# Patient Record
Sex: FEMALE | Race: WHITE | Employment: OTHER | URBAN - METROPOLITAN AREA
[De-identification: names, ages, dates, MRNs, and addresses within clinical notes are randomized per-mention and may not be internally consistent; named-entity substitution may affect disease eponyms.]

---

## 2017-05-19 ENCOUNTER — HOSPITAL ENCOUNTER (OUTPATIENT)
Dept: MAMMOGRAPHY | Age: 72
Discharge: HOME | End: 2017-05-19
Attending: OBSTETRICS & GYNECOLOGY
Payer: MEDICARE

## 2017-05-19 DIAGNOSIS — Z12.31 VISIT FOR SCREENING MAMMOGRAM: ICD-10-CM

## 2017-05-19 PROCEDURE — 77067 SCR MAMMO BI INCL CAD: CPT

## 2018-01-18 ENCOUNTER — ANESTHESIA (OUTPATIENT)
Dept: NON INVASIVE DIAGNOSTICS | Age: 73
End: 2018-01-18
Payer: MEDICARE

## 2018-01-18 ENCOUNTER — HOSPITAL ENCOUNTER (OUTPATIENT)
Dept: NON INVASIVE DIAGNOSTICS | Age: 73
Discharge: HOME | End: 2018-01-19
Attending: INTERNAL MEDICINE | Admitting: INTERNAL MEDICINE
Payer: MEDICARE

## 2018-01-18 ENCOUNTER — ANESTHESIA EVENT (OUTPATIENT)
Dept: NON INVASIVE DIAGNOSTICS | Age: 73
End: 2018-01-18
Payer: MEDICARE

## 2018-01-18 PROBLEM — Z98.890 S/P ABLATION OF ATRIAL FIBRILLATION: Status: ACTIVE | Noted: 2018-01-18

## 2018-01-18 PROBLEM — Z86.79 S/P ABLATION OF ATRIAL FIBRILLATION: Status: ACTIVE | Noted: 2018-01-18

## 2018-01-18 PROCEDURE — 76060000039 HC ANESTHESIA 4 TO 4.5 HR

## 2018-01-18 PROCEDURE — 74011250636 HC RX REV CODE- 250/636

## 2018-01-18 PROCEDURE — 77030034850

## 2018-01-18 PROCEDURE — 93662 INTRACARDIAC ECG (ICE): CPT

## 2018-01-18 PROCEDURE — 77030003700 HC NDL TSEPTL STJU -C

## 2018-01-18 PROCEDURE — 77030011640 HC PAD GRND REM COVD -A

## 2018-01-18 PROCEDURE — 77030018729 HC ELECTRD DEFIB PAD CARD -B

## 2018-01-18 PROCEDURE — 93613 INTRACARDIAC EPHYS 3D MAPG: CPT

## 2018-01-18 PROCEDURE — 74011250636 HC RX REV CODE- 250/636: Performed by: INTERNAL MEDICINE

## 2018-01-18 PROCEDURE — 77030013797 HC KT TRNSDUC PRSSR EDWD -A

## 2018-01-18 PROCEDURE — 76210000006 HC OR PH I REC 0.5 TO 1 HR

## 2018-01-18 PROCEDURE — C1759 CATH, INTRA ECHOCARDIOGRAPHY: HCPCS

## 2018-01-18 PROCEDURE — 77030026438 HC STYL ET INTUB CARD -A: Performed by: ANESTHESIOLOGY

## 2018-01-18 PROCEDURE — C1766 INTRO/SHEATH,STRBLE,NON-PEEL: HCPCS

## 2018-01-18 PROCEDURE — C1731 CATH, EP, 20 OR MORE ELEC: HCPCS

## 2018-01-18 PROCEDURE — 93325 DOPPLER ECHO COLOR FLOW MAPG: CPT

## 2018-01-18 PROCEDURE — 77030016898 HC CBL EP EXT3 STJU -B

## 2018-01-18 PROCEDURE — 77030030806 HC PTCH ENSIT NAVX STJU -G

## 2018-01-18 PROCEDURE — 77030033352 HC TBNG IRR PMP COOL PNT STJU -B

## 2018-01-18 PROCEDURE — 74011000250 HC RX REV CODE- 250

## 2018-01-18 PROCEDURE — 74011250637 HC RX REV CODE- 250/637: Performed by: INTERNAL MEDICINE

## 2018-01-18 PROCEDURE — 77030008684 HC TU ET CUF COVD -B: Performed by: ANESTHESIOLOGY

## 2018-01-18 PROCEDURE — 77010033678 HC OXYGEN DAILY

## 2018-01-18 PROCEDURE — 77030029359 HC PRB ESOPH TEMP CATH ANTM -F

## 2018-01-18 PROCEDURE — 77030015398 HC CBL EP EXT STJU -C

## 2018-01-18 PROCEDURE — 77030029065 HC DRSG HEMO QCLOT ZMED -B

## 2018-01-18 PROCEDURE — C1894 INTRO/SHEATH, NON-LASER: HCPCS

## 2018-01-18 PROCEDURE — C1893 INTRO/SHEATH, FIXED,NON-PEEL: HCPCS

## 2018-01-18 PROCEDURE — C2630 CATH, EP, COOL-TIP: HCPCS

## 2018-01-18 RX ORDER — MIDAZOLAM HYDROCHLORIDE 1 MG/ML
INJECTION, SOLUTION INTRAMUSCULAR; INTRAVENOUS
Status: DISCONTINUED
Start: 2018-01-18 | End: 2018-01-18

## 2018-01-18 RX ORDER — AMIODARONE HYDROCHLORIDE 200 MG/1
200 TABLET ORAL 2 TIMES DAILY
COMMUNITY
End: 2019-09-23

## 2018-01-18 RX ORDER — LIDOCAINE HYDROCHLORIDE 20 MG/ML
INJECTION, SOLUTION EPIDURAL; INFILTRATION; INTRACAUDAL; PERINEURAL AS NEEDED
Status: DISCONTINUED | OUTPATIENT
Start: 2018-01-18 | End: 2018-01-18

## 2018-01-18 RX ORDER — DEXAMETHASONE SODIUM PHOSPHATE 4 MG/ML
INJECTION, SOLUTION INTRA-ARTICULAR; INTRALESIONAL; INTRAMUSCULAR; INTRAVENOUS; SOFT TISSUE AS NEEDED
Status: DISCONTINUED | OUTPATIENT
Start: 2018-01-18 | End: 2018-01-18 | Stop reason: HOSPADM

## 2018-01-18 RX ORDER — OXYCODONE AND ACETAMINOPHEN 5; 325 MG/1; MG/1
1 TABLET ORAL
Status: DISCONTINUED | OUTPATIENT
Start: 2018-01-18 | End: 2018-01-19 | Stop reason: HOSPADM

## 2018-01-18 RX ORDER — PROTAMINE SULFATE 10 MG/ML
25-100 INJECTION, SOLUTION INTRAVENOUS
Status: DISCONTINUED | OUTPATIENT
Start: 2018-01-18 | End: 2018-01-18

## 2018-01-18 RX ORDER — ACETAMINOPHEN 325 MG/1
650 TABLET ORAL
Status: DISCONTINUED | OUTPATIENT
Start: 2018-01-18 | End: 2018-01-19 | Stop reason: HOSPADM

## 2018-01-18 RX ORDER — ONDANSETRON 2 MG/ML
INJECTION INTRAMUSCULAR; INTRAVENOUS AS NEEDED
Status: DISCONTINUED | OUTPATIENT
Start: 2018-01-18 | End: 2018-01-18 | Stop reason: HOSPADM

## 2018-01-18 RX ORDER — HEPARIN SODIUM 1000 [USP'U]/ML
INJECTION, SOLUTION INTRAVENOUS; SUBCUTANEOUS
Status: DISCONTINUED | OUTPATIENT
Start: 2018-01-18 | End: 2018-01-18 | Stop reason: HOSPADM

## 2018-01-18 RX ORDER — SUCRALFATE 1 G/10ML
1 SUSPENSION ORAL
Status: DISCONTINUED | OUTPATIENT
Start: 2018-01-18 | End: 2018-01-19 | Stop reason: HOSPADM

## 2018-01-18 RX ORDER — FENTANYL CITRATE 50 UG/ML
12.5-5 INJECTION, SOLUTION INTRAMUSCULAR; INTRAVENOUS
Status: DISCONTINUED | OUTPATIENT
Start: 2018-01-18 | End: 2018-01-18

## 2018-01-18 RX ORDER — SODIUM CHLORIDE 0.9 % (FLUSH) 0.9 %
5-10 SYRINGE (ML) INJECTION AS NEEDED
Status: DISCONTINUED | OUTPATIENT
Start: 2018-01-18 | End: 2018-01-19 | Stop reason: HOSPADM

## 2018-01-18 RX ORDER — FENTANYL CITRATE 50 UG/ML
INJECTION, SOLUTION INTRAMUSCULAR; INTRAVENOUS AS NEEDED
Status: DISCONTINUED | OUTPATIENT
Start: 2018-01-18 | End: 2018-01-18 | Stop reason: HOSPADM

## 2018-01-18 RX ORDER — EPHEDRINE SULFATE 50 MG/ML
INJECTION, SOLUTION INTRAVENOUS AS NEEDED
Status: DISCONTINUED | OUTPATIENT
Start: 2018-01-18 | End: 2018-01-18 | Stop reason: HOSPADM

## 2018-01-18 RX ORDER — PROPOFOL 10 MG/ML
INJECTION, EMULSION INTRAVENOUS AS NEEDED
Status: DISCONTINUED | OUTPATIENT
Start: 2018-01-18 | End: 2018-01-18 | Stop reason: HOSPADM

## 2018-01-18 RX ORDER — HEPARIN SODIUM 1000 [USP'U]/ML
30000 INJECTION, SOLUTION INTRAVENOUS; SUBCUTANEOUS ONCE
Status: COMPLETED | OUTPATIENT
Start: 2018-01-18 | End: 2018-01-18

## 2018-01-18 RX ORDER — ENOXAPARIN SODIUM 100 MG/ML
1 INJECTION SUBCUTANEOUS EVERY 12 HOURS
Status: COMPLETED | OUTPATIENT
Start: 2018-01-18 | End: 2018-01-19

## 2018-01-18 RX ORDER — HYDROCHLOROTHIAZIDE 25 MG/1
25 TABLET ORAL DAILY
COMMUNITY
End: 2022-02-07

## 2018-01-18 RX ORDER — AMIODARONE HYDROCHLORIDE 200 MG/1
200 TABLET ORAL 2 TIMES DAILY
Status: DISCONTINUED | OUTPATIENT
Start: 2018-01-18 | End: 2018-01-19 | Stop reason: HOSPADM

## 2018-01-18 RX ORDER — SODIUM CHLORIDE 9 MG/ML
INJECTION, SOLUTION INTRAVENOUS
Status: DISCONTINUED | OUTPATIENT
Start: 2018-01-18 | End: 2018-01-18 | Stop reason: HOSPADM

## 2018-01-18 RX ORDER — MIDAZOLAM HYDROCHLORIDE 1 MG/ML
1-5 INJECTION, SOLUTION INTRAMUSCULAR; INTRAVENOUS
Status: DISCONTINUED | OUTPATIENT
Start: 2018-01-18 | End: 2018-01-18

## 2018-01-18 RX ORDER — HEPARIN SODIUM 10000 [USP'U]/100ML
INJECTION, SOLUTION INTRAVENOUS
Status: DISCONTINUED
Start: 2018-01-18 | End: 2018-01-18

## 2018-01-18 RX ORDER — SODIUM CHLORIDE 0.9 % (FLUSH) 0.9 %
5-10 SYRINGE (ML) INJECTION EVERY 8 HOURS
Status: DISCONTINUED | OUTPATIENT
Start: 2018-01-18 | End: 2018-01-19 | Stop reason: HOSPADM

## 2018-01-18 RX ORDER — LORATADINE 10 MG/1
10 TABLET ORAL DAILY
Status: DISCONTINUED | OUTPATIENT
Start: 2018-01-19 | End: 2018-01-19 | Stop reason: HOSPADM

## 2018-01-18 RX ORDER — FENTANYL CITRATE 50 UG/ML
INJECTION, SOLUTION INTRAMUSCULAR; INTRAVENOUS
Status: DISCONTINUED
Start: 2018-01-18 | End: 2018-01-18

## 2018-01-18 RX ORDER — SUCCINYLCHOLINE CHLORIDE 20 MG/ML
INJECTION INTRAMUSCULAR; INTRAVENOUS AS NEEDED
Status: DISCONTINUED | OUTPATIENT
Start: 2018-01-18 | End: 2018-01-18 | Stop reason: HOSPADM

## 2018-01-18 RX ORDER — PANTOPRAZOLE SODIUM 40 MG/1
40 TABLET, DELAYED RELEASE ORAL
Status: DISCONTINUED | OUTPATIENT
Start: 2018-01-19 | End: 2018-01-19 | Stop reason: HOSPADM

## 2018-01-18 RX ORDER — PHENYLEPHRINE HCL IN 0.9% NACL 0.4MG/10ML
SYRINGE (ML) INTRAVENOUS AS NEEDED
Status: DISCONTINUED | OUTPATIENT
Start: 2018-01-18 | End: 2018-01-18 | Stop reason: HOSPADM

## 2018-01-18 RX ORDER — HYDROCHLOROTHIAZIDE 25 MG/1
25 TABLET ORAL DAILY
Status: DISCONTINUED | OUTPATIENT
Start: 2018-01-19 | End: 2018-01-19 | Stop reason: HOSPADM

## 2018-01-18 RX ORDER — METOPROLOL SUCCINATE 50 MG/1
50 TABLET, EXTENDED RELEASE ORAL DAILY
Status: DISCONTINUED | OUTPATIENT
Start: 2018-01-19 | End: 2018-01-19 | Stop reason: HOSPADM

## 2018-01-18 RX ORDER — DOBUTAMINE HYDROCHLORIDE 200 MG/100ML
INJECTION INTRAVENOUS
Status: DISCONTINUED
Start: 2018-01-18 | End: 2018-01-18

## 2018-01-18 RX ORDER — MIDAZOLAM HYDROCHLORIDE 1 MG/ML
INJECTION, SOLUTION INTRAMUSCULAR; INTRAVENOUS AS NEEDED
Status: DISCONTINUED | OUTPATIENT
Start: 2018-01-18 | End: 2018-01-18 | Stop reason: HOSPADM

## 2018-01-18 RX ORDER — HEPARIN SODIUM 200 [USP'U]/100ML
2000 INJECTION, SOLUTION INTRAVENOUS ONCE
Status: COMPLETED | OUTPATIENT
Start: 2018-01-18 | End: 2018-01-18

## 2018-01-18 RX ORDER — ONDANSETRON 2 MG/ML
4 INJECTION INTRAMUSCULAR; INTRAVENOUS
Status: DISCONTINUED | OUTPATIENT
Start: 2018-01-18 | End: 2018-01-19 | Stop reason: HOSPADM

## 2018-01-18 RX ADMIN — Medication 40 MCG: at 10:54

## 2018-01-18 RX ADMIN — SUCCINYLCHOLINE CHLORIDE 160 MG: 20 INJECTION INTRAMUSCULAR; INTRAVENOUS at 09:32

## 2018-01-18 RX ADMIN — SUCCINYLCHOLINE CHLORIDE 40 MG: 20 INJECTION INTRAMUSCULAR; INTRAVENOUS at 09:33

## 2018-01-18 RX ADMIN — ACETAMINOPHEN 650 MG: 325 TABLET ORAL at 20:21

## 2018-01-18 RX ADMIN — EPHEDRINE SULFATE 5 MG: 50 INJECTION, SOLUTION INTRAVENOUS at 12:35

## 2018-01-18 RX ADMIN — EPHEDRINE SULFATE 7.5 MG: 50 INJECTION, SOLUTION INTRAVENOUS at 09:56

## 2018-01-18 RX ADMIN — HEPARIN SODIUM 3000 UNITS: 1000 INJECTION, SOLUTION INTRAVENOUS; SUBCUTANEOUS at 11:10

## 2018-01-18 RX ADMIN — PROTAMINE SULFATE 70 MG: 10 INJECTION, SOLUTION INTRAVENOUS at 13:06

## 2018-01-18 RX ADMIN — HEPARIN SODIUM 3000 UNITS: 1000 INJECTION, SOLUTION INTRAVENOUS; SUBCUTANEOUS at 13:01

## 2018-01-18 RX ADMIN — SODIUM CHLORIDE: 9 INJECTION, SOLUTION INTRAVENOUS at 13:30

## 2018-01-18 RX ADMIN — FENTANYL CITRATE 12.5 MCG: 50 INJECTION, SOLUTION INTRAMUSCULAR; INTRAVENOUS at 15:01

## 2018-01-18 RX ADMIN — Medication 10 ML: at 15:02

## 2018-01-18 RX ADMIN — PROPOFOL 50 MG: 10 INJECTION, EMULSION INTRAVENOUS at 10:56

## 2018-01-18 RX ADMIN — MIDAZOLAM HYDROCHLORIDE 2 MG: 1 INJECTION, SOLUTION INTRAMUSCULAR; INTRAVENOUS at 09:27

## 2018-01-18 RX ADMIN — FENTANYL CITRATE 50 MCG: 50 INJECTION, SOLUTION INTRAMUSCULAR; INTRAVENOUS at 09:32

## 2018-01-18 RX ADMIN — HEPARIN SODIUM 13000 UNITS: 1000 INJECTION, SOLUTION INTRAVENOUS; SUBCUTANEOUS at 10:21

## 2018-01-18 RX ADMIN — EPHEDRINE SULFATE 5 MG: 50 INJECTION, SOLUTION INTRAVENOUS at 11:00

## 2018-01-18 RX ADMIN — ENOXAPARIN SODIUM 120 MG: 60 INJECTION SUBCUTANEOUS at 20:21

## 2018-01-18 RX ADMIN — AMIODARONE HYDROCHLORIDE 200 MG: 200 TABLET ORAL at 19:19

## 2018-01-18 RX ADMIN — PROPOFOL 200 MG: 10 INJECTION, EMULSION INTRAVENOUS at 09:32

## 2018-01-18 RX ADMIN — SODIUM CHLORIDE: 9 INJECTION, SOLUTION INTRAVENOUS at 09:11

## 2018-01-18 RX ADMIN — HEPARIN SODIUM 1000 UNITS/HR: 1000 INJECTION, SOLUTION INTRAVENOUS; SUBCUTANEOUS at 10:22

## 2018-01-18 RX ADMIN — SUCRALFATE 1 G: 1 SUSPENSION ORAL at 15:54

## 2018-01-18 RX ADMIN — ACETAMINOPHEN 650 MG: 325 TABLET ORAL at 15:54

## 2018-01-18 RX ADMIN — FENTANYL CITRATE 50 MCG: 50 INJECTION, SOLUTION INTRAMUSCULAR; INTRAVENOUS at 09:45

## 2018-01-18 RX ADMIN — PROPOFOL 50 MG: 10 INJECTION, EMULSION INTRAVENOUS at 10:58

## 2018-01-18 RX ADMIN — HEPARIN SODIUM 5000 UNITS: 1000 INJECTION, SOLUTION INTRAVENOUS; SUBCUTANEOUS at 10:37

## 2018-01-18 RX ADMIN — PROPOFOL 30 MG: 10 INJECTION, EMULSION INTRAVENOUS at 10:00

## 2018-01-18 RX ADMIN — ONDANSETRON 4 MG: 2 INJECTION INTRAMUSCULAR; INTRAVENOUS at 13:15

## 2018-01-18 RX ADMIN — HEPARIN SODIUM 3000 UNITS: 1000 INJECTION, SOLUTION INTRAVENOUS; SUBCUTANEOUS at 11:34

## 2018-01-18 RX ADMIN — Medication 40 MCG: at 10:00

## 2018-01-18 RX ADMIN — HEPARIN SODIUM 3000 UNITS: 1000 INJECTION, SOLUTION INTRAVENOUS; SUBCUTANEOUS at 10:53

## 2018-01-18 RX ADMIN — DEXAMETHASONE SODIUM PHOSPHATE 10 MG: 4 INJECTION, SOLUTION INTRA-ARTICULAR; INTRALESIONAL; INTRAMUSCULAR; INTRAVENOUS; SOFT TISSUE at 09:59

## 2018-01-18 RX ADMIN — Medication 80 MCG: at 11:11

## 2018-01-18 RX ADMIN — Medication 4000 UNITS: at 10:01

## 2018-01-18 RX ADMIN — EPHEDRINE SULFATE 5 MG: 50 INJECTION, SOLUTION INTRAVENOUS at 10:45

## 2018-01-18 NOTE — ANESTHESIA POSTPROCEDURE EVALUATION
Post-Anesthesia Evaluation and Assessment    Patient: Kim Singh MRN: [id number]  SSN: [id number]    YOB: 1945  Age: 81 y.o. Sex: female       Cardiovascular Function/Vital Signs  Visit Vitals    /64    Pulse 63    Temp 36.7 °C (98 °F)    Resp 13    Ht 5' 2\" (1.575 m)    Wt 124.7 kg (275 lb)    SpO2 95%    BMI 50.3 kg/m2       Patient is status post general anesthesia for * No procedures listed *. Nausea/Vomiting: None    Postoperative hydration reviewed and adequate. Pain:  Pain Scale 1: Numeric (0 - 10) (01/18/18 1415)  Pain Intensity 1: 0 (01/18/18 1415)   Managed    Neurological Status:   Neuro (WDL): Within Defined Limits (01/18/18 1415)   At baseline    Mental Status and Level of Consciousness: Arousable    Pulmonary Status:   O2 Device: Nasal cannula (01/18/18 1415)   Adequate oxygenation and airway patent    Complications related to anesthesia: None    Post-anesthesia assessment completed.  No concerns    Signed By: Kendell Mcwilliams MD     January 18, 2018

## 2018-01-18 NOTE — PROGRESS NOTES
YUNI preliminarily shows normal LV systolic fx, moderate MR without prolapse or vegetation, dilated LA without thrombus, no HARMONY thrombus, mildly-elevated R sided pressure per TR jet, small circumferential pericardial effusion, no significant aortic dilation, dissection, or plaque. Full report to follow.

## 2018-01-18 NOTE — PERIOP NOTES
Handoff Report from Operating Room to PACU    Report received from [address] Moore ( Tonya Andino CRNA/Celena Moses CRNA regarding [employer]. Surgeon(s):  Case Anesthesia  And Procedure(s) (LRB):  PACU/RECOVERY                                    EP/LAB (N/A)  confirmed   with allergies, drains and dressings discussed. Anesthesia type, drugs, patient history, complications, estimated blood loss, vital signs, intake and output, and last pain medication were reviewed.

## 2018-01-18 NOTE — ANESTHESIA PREPROCEDURE EVALUATION
Anesthetic History   No history of anesthetic complications            Review of Systems / Medical History  Patient summary reviewed, nursing notes reviewed and pertinent labs reviewed    Pulmonary  Within defined limits                 Neuro/Psych         Headaches     Cardiovascular            Dysrhythmias : atrial fibrillation      Exercise tolerance: <4 METS  Comments: Hx Sick sinus syndrome      GI/Hepatic/Renal  Within defined limits              Endo/Other        Morbid obesity and arthritis     Other Findings   Comments: Hx Colovaginal fistula           Physical Exam    Airway  Mallampati: III  TM Distance: > 6 cm  Neck ROM: normal range of motion   Mouth opening: Normal     Cardiovascular  Regular rate and rhythm,  S1 and S2 normal,  no murmur, click, rub, or gallop             Dental  No notable dental hx       Pulmonary  Breath sounds clear to auscultation               Abdominal  GI exam deferred       Other Findings            Anesthetic Plan    ASA: 3  Anesthesia type: general          Induction: Intravenous  Anesthetic plan and risks discussed with: Patient

## 2018-01-18 NOTE — PROGRESS NOTES
S/p catheter PVI with RFA of 4/4 PV's. During PVI, the atypical and likely LA flutter terminated. LA roof, antral mitral isthmus, CTI lines done. The ant mitral isthmus line had conduction delay at the superior portion near the LA roof line--after multiple applications of RFA in that region. Pacing induced RA flutter which terminated during the CTI line. Full report to follow. No complications.

## 2018-01-18 NOTE — IP AVS SNAPSHOT
[address] [facility] 
[phone] Patient: Lella Castleman MRN: [id number] VRA:6/14/7264 About your hospitalization You were admitted on:  January 18, 2018 You last received care in the:  [facility] CARDIO You were discharged on:  January 19, 2018 Why you were hospitalized Your primary diagnosis was:  Not on File Your diagnoses also included:  S/P Ablation Of Atrial Fibrillation Follow-up Information Follow up With Details Comments Contact Info Shelle Gaucher, MD Schedule an appointment as soon as possible for a visit in 1 week for post hospitalization follow up appointment. [address] [facility] [address] [facility] 
[phone] Caleb Begum NP Schedule an appointment as soon as possible for a visit in 2 weeks for post-ablation follow-up [address] [facility] [address] [facility] 
[phone] Discharge Orders None A check harvey indicates which time of day the medication should be taken. My Medications START taking these medications Instructions Each Dose to Equal  
 Morning Noon Evening Bedtime  
 pantoprazole 40 mg tablet Commonly known as:  PROTONIX Start taking on:  1/20/2018 Your last dose was: Your next dose is: Take 1 Tab by mouth Daily (before breakfast). 40 mg  
    
   
   
   
  
 sucralfate 100 mg/mL suspension Commonly known as:  Martinez Kamara Your last dose was: Your next dose is: Take 10 mL by mouth Before breakfast and dinner for 30 days. 1 g CONTINUE taking these medications Instructions Each Dose to Equal  
 Morning Noon Evening Bedtime  
 amiodarone 200 mg tablet Commonly known as:  CORDARONE Your last dose was: Your next dose is: Take 200 mg by mouth two (2) times a day.   
 200 mg  
    
   
   
   
 hydroCHLOROthiazide 25 mg tablet Commonly known as:  HYDRODIURIL Your last dose was: Your next dose is: Take 25 mg by mouth daily. 25 mg  
    
   
   
   
  
 loratadine 10 mg tablet Commonly known as:  Martha Jayjay Your last dose was: Your next dose is: Take 10 mg by mouth. 10 mg  
    
   
   
   
  
 multivitamin tablet Commonly known as:  ONE A DAY Your last dose was: Your next dose is: Take 1 Tab by mouth daily. 1 Tab  
    
   
   
   
  
 ondansetron 8 mg disintegrating tablet Commonly known as:  ZOFRAN ODT Your last dose was: Your next dose is: Take 1 Tab by mouth every eight (8) hours as needed for Nausea. 8 mg XARELTO 20 mg Tab tablet Generic drug:  rivaroxaban Your last dose was: Your next dose is: Take 20 mg by mouth daily. 20 mg  
    
   
   
   
  
  
STOP taking these medications   
 metoprolol succinate 100 mg tablet Commonly known as:  TOPROL-XL Where to Get Your Medications These medications were sent to [facility], [address] [address]  [address] [place] [address] Phone:  [phone]  
  pantoprazole 40 mg tablet  
 sucralfate 100 mg/mL suspension Discharge Instructions POST-EP STUDY AND ABLATION DISCHARGE INSTRUCTIONS: 
 
You had a transesophageal echo followed by an atrial fibrillation ablation (using radiofrequency energy) with Dr. Carmelo Juarez on 1/18/2018. Please see the medication changes made. You will have two new medications that you'll take for one month only. Your metoprolol was discontinued due to lower heart rates and normal blood pressure. If you can check your blood pressure daily and call the office if the top number (systolic blood pressure) is greater than 150 mm Hg, that would be good. Follow-up with Dr. Carson Cole nurse practitioner, Brooke Crum, in 2 weeks. At that time, if you remain in your normal rhythm, will recommend lowering your amiodarone to once daily. A follow-up appointment will be made for 3 months with Dr. Katy Lundborg to evaluate for further medication adjustment. Do not drive, operate any machinery, or sign any legal documents for 24 hours after your procedure. You must have someone to drive you home. You may take a shower 24 hours after your cardiac procedure. Be sure to get the dressing wet and then remove it; gently wash the area with warm soapy water. Pat dry and leave open to air. To help prevent infections, be sure to keep the cath site clean and dry. No lotions, creams, powders, ointments, etc. in the cath site for approximately 1 week. ? Do not take a tub bath, get in a hot tub or swimming pool for approximately 5 days or until the cath site is completely healed. ? No strenuous activity or heavy lifting over 20 lbs. for 7 days. ? After your procedure, some bruising or discomfort is common during the healing process. Tylenol, 1-2 tablets every 6 hours as needed, is recommended if you experience any discomfort. If you experience any signs or symptoms of infection such as fever, chills, or poorly healing incision, persistent tenderness or swelling in the groin, redness and/or warmth to the touch, numbness, significant tingling or pain at the groin site or affected extremity, rash, drainage from the site, or if the leg feels tight or swollen, call your physician right away. ? If bleeding at the site occurs, take a clean gauze pad and apply direct pressure to the groin just above the puncture site, and call your physician right away. ? If your procedure involved ablation therapy, you may feel some mild or vague chest discomfort due to delivery of heat therapy to the heart muscle. This should resolve in 1-2 days.   If it gets worse or is associated with shortness of breath, dizziness, loss of consciousness, call your physician right away or call 911 if emergency medical care is needed. Signed: 
Mehnaz Nicole MD 
 
  
  
  
Introducing \[facility]\"" & HEALTH SERVICES! Dear Juliana: Thank you for requesting a [employer] account. Our records indicate that you have previously registered for a [employer] account but its currently inactive. Please call our [employer] support line at [phone]. Additional Information If you have questions, please visit the Frequently Asked Questions section of the [employer] website at [email or web address]/. Remember, [employer] is NOT to be used for urgent needs. For medical emergencies, dial 911. Now available from your iPhone and Android! Providers Seen During Your Hospitalization Provider Specialty Primary office phone Claire Turner MD Cardiology [phone] Your Primary Care Physician (PCP) Primary Care Physician Office Phone Office Fax Neetu Martinezmatti [phone] You are allergic to the following Allergen Reactions Pcn (Penicillins) Rash  
    
 Sulfa (Sulfonamide Antibiotics) Rash Recent Documentation Height Weight BMI OB Status Smoking Status 1.575 m 124.7 kg 50.3 kg/m2 Hysterectomy Never Smoker Emergency Contacts Name Discharge Info Relation Home Work Mobile [address] [email or web address] CAREGIVER [3] Spouse [3] [phone] Patient Belongings The following personal items are in your possession at time of discharge: 
  Dental Appliances: None         Home Medications: None   Jewelry: None  Clothing: Footwear, Pants, Shirt, Undergarments    Other Valuables: None Please provide this summary of care documentation to your next provider. Signatures-by signing, you are acknowledging that this After Visit Summary has been reviewed with you and you have received a copy. Patient Signature:  ____________________________________________________________ Date:  ____________________________________________________________  
  
Anna Sleeper Provider Signature:  ____________________________________________________________ Date:  ____________________________________________________________

## 2018-01-18 NOTE — PROGRESS NOTES
01/18/18 1844   Vital Signs   Temp 98.2 °F (36.8 °C)   Temp Source Oral   Pulse (Heart Rate) 68   Heart Rate Source Monitor   Resp Rate 20   O2 Sat (%) 98 %   Level of Consciousness Alert   /70   MAP (Monitor) 77   BP 1 Method Automatic   BP 1 Location Left arm   BP Patient Position Post activity   MEWS Score 1       Pt ambulated in hallway, complaints of mild fatigue, no SOB/pain, VSS, bilateral groin sites remain benign

## 2018-01-18 NOTE — PROGRESS NOTES
1455-pt arrived to room, VSS, bilateral groin sites no bleeding/heamatoma.   1500-pt complains of lower back pain, repositioned pt and gave PRN fentanyl   1548-pt complaining on pain in right shoulder, tylenol given, VSS, bilateral groin sites remain benign  1645-pt resting in bed, no complaints, back and shoulder feeling better, bilateral groin sites benign

## 2018-01-19 VITALS
HEIGHT: 62 IN | SYSTOLIC BLOOD PRESSURE: 116 MMHG | DIASTOLIC BLOOD PRESSURE: 62 MMHG | RESPIRATION RATE: 16 BRPM | BODY MASS INDEX: 50.61 KG/M2 | TEMPERATURE: 98.1 F | HEART RATE: 59 BPM | OXYGEN SATURATION: 95 % | WEIGHT: 275 LBS

## 2018-01-19 PROCEDURE — 74011250636 HC RX REV CODE- 250/636: Performed by: INTERNAL MEDICINE

## 2018-01-19 PROCEDURE — 74011250637 HC RX REV CODE- 250/637: Performed by: INTERNAL MEDICINE

## 2018-01-19 RX ORDER — PANTOPRAZOLE SODIUM 40 MG/1
40 TABLET, DELAYED RELEASE ORAL
Qty: 30 TAB | Refills: 0 | Status: SHIPPED | OUTPATIENT
Start: 2018-01-20 | End: 2019-09-23

## 2018-01-19 RX ORDER — SUCRALFATE 1 G/10ML
1 SUSPENSION ORAL
Qty: 600 ML | Refills: 0 | Status: SHIPPED | OUTPATIENT
Start: 2018-01-19 | End: 2018-02-18

## 2018-01-19 RX ADMIN — ENOXAPARIN SODIUM 120 MG: 60 INJECTION SUBCUTANEOUS at 08:31

## 2018-01-19 RX ADMIN — PANTOPRAZOLE SODIUM 40 MG: 40 TABLET, DELAYED RELEASE ORAL at 08:31

## 2018-01-19 RX ADMIN — Medication 10 ML: at 06:28

## 2018-01-19 RX ADMIN — ACETAMINOPHEN 650 MG: 325 TABLET ORAL at 06:28

## 2018-01-19 RX ADMIN — SUCRALFATE 1 G: 1 SUSPENSION ORAL at 08:31

## 2018-01-19 RX ADMIN — HYDROCHLOROTHIAZIDE 25 MG: 25 TABLET ORAL at 08:32

## 2018-01-19 RX ADMIN — AMIODARONE HYDROCHLORIDE 200 MG: 200 TABLET ORAL at 08:32

## 2018-01-19 NOTE — PROGRESS NOTES
S/p YUNI followed by Afib ablation (catheter RFA involving PVI, LA substrate work, CTI line). Ambulatory and taking oral.  Currently eating breakfast.  She feels good, no complaints. Visit Vitals    /62    Pulse (!) 59    Temp 98.1 °F (36.7 °C)    Resp 16    Ht 5' 2\" (1.575 m)    Wt 124.7 kg (275 lb)    SpO2 95%    BMI 50.3 kg/m2       ND, NAD.  RRR, no rub. Lungs CTAB anteriorly. Bilateral groin sites OK. Awake, appropriate, neuro grossly nonfocal.    Tele:  SR 60's with 1AVB. PLAN:  Discharge to home with F/U in the office in 2 weeks with Aleks Srinivasan NP. Will stop metoprolol. See discharge medications. All questions answered. Patient is aware of signs and sx warranting urgent med F/U or calling 911.     Signed:  Naveen Smith MD

## 2018-01-19 NOTE — DISCHARGE INSTRUCTIONS
POST-EP STUDY AND ABLATION DISCHARGE INSTRUCTIONS:    You had a transesophageal echo followed by an atrial fibrillation ablation (using radiofrequency energy) with Dr. Jesús Rodriguez on 1/18/2018. Please see the medication changes made. You will have two new medications that you'll take for one month only. Your metoprolol was discontinued due to lower heart rates and normal blood pressure. If you can check your blood pressure daily and call the office if the top number (systolic blood pressure) is greater than 150 mm Hg, that would be good. Follow-up with Dr. Estevan Momin nurse practitioner, Krystal Greco, in 2 weeks. At that time, if you remain in your normal rhythm, will recommend lowering your amiodarone to once daily. A follow-up appointment will be made for 3 months with Dr. Jesús Rodriguez to evaluate for further medication adjustment. Do not drive, operate any machinery, or sign any legal documents for 24 hours after your procedure. You must have someone to drive you home. You may take a shower 24 hours after your cardiac procedure. Be sure to get the dressing wet and then remove it; gently wash the area with warm soapy water. Pat dry and leave open to air. To help prevent infections, be sure to keep the cath site clean and dry. No lotions, creams, powders, ointments, etc. in the cath site for approximately 1 week.  Do not take a tub bath, get in a hot tub or swimming pool for approximately 5 days or until the cath site is completely healed.  No strenuous activity or heavy lifting over 20 lbs. for 7 days.  After your procedure, some bruising or discomfort is common during the healing process. Tylenol, 1-2 tablets every 6 hours as needed, is recommended if you experience any discomfort.   If you experience any signs or symptoms of infection such as fever, chills, or poorly healing incision, persistent tenderness or swelling in the groin, redness and/or warmth to the touch, numbness, significant tingling or pain at the groin site or affected extremity, rash, drainage from the site, or if the leg feels tight or swollen, call your physician right away.  If bleeding at the site occurs, take a clean gauze pad and apply direct pressure to the groin just above the puncture site, and call your physician right away.  If your procedure involved ablation therapy, you may feel some mild or vague chest discomfort due to delivery of heat therapy to the heart muscle. This should resolve in 1-2 days. If it gets worse or is associated with shortness of breath, dizziness, loss of consciousness, call your physician right away or call 911 if emergency medical care is needed.     Signed:  Lilliam De La O MD

## 2018-01-19 NOTE — CARDIO/PULMONARY
C/P Rehab Note:    Chart Reviewed. S/p catheter PVI with RFA of 4/4 PV's. During PVI, the atypical and likely LA flutter terminated. LV EF by Echo on 9/15 55-60%. YUNI results from yesterday pending. Teaching deferred.

## 2018-01-19 NOTE — PROGRESS NOTES
CM reviewed Outpatient [facility] record and identified no discharge needs at this time.       Ricardo Baxter

## 2018-01-20 NOTE — PROCEDURES
[address]  [phone]    Patient ID:  Patient: Lori Rowland  MRN: [id number]  Age: 81 y.o.  : 1945  Gender: female  Study Date: 2018        History: This is a female with a history of paroxysmal atrial fibrillation, off anticoagulation prior to ablation.  She is here for YUNI evaluation to screen for intracardiac thrombus.      PROCEDURE: The study included complete 2D imaging, M-mode, complete spectral Doppler, and color Doppler. The YUNI probe was passed easily into the esophagus. Images were adequate. At the end of the procedure, the probe was removed without issue. There were no complications during the procedure.  Atrial fibrillation was present.  Sedation was administered by the anesthesiologist who was in constant attendance throughout the procedure.       FINDINGS:  LEFT VENTRICLE: Size was normal. Systolic function was normal with an ejection fraction estimated to be 60%.  Wall thickness was normal.    RIGHT VENTRICLE: The size was normal. Systolic function was normal. Wall thickness was normal.    LEFT ATRIUM: Size was moderately dilated.  No thrombus or mass was identified.  APPENDAGE:  No thrombus was identified. Doppler findings consistent with atrial fibrillation. ATRIAL SEPTUM: No evidence of a septal defect or PFO or aneurysm was present.  No shunting was identified with color flow doppler. RIGHT ATRIUM: Size was mildly dilated. No thrombus or mass was identified. MITRAL VALVE:  Normal valve structure. There was normal leaflet separation.  DOPPLER: There was moderate non-rheumatic regurgitation. AORTIC VALVE: The aortic valve was trileaflet. Leaflets exhibited normal cuspal separation.  No mass, vegetation or thrombus noted. DOPPLER: There was no regurgitation.      PULMONIC VALVE:  Poorly-visualized, no obvious abnormality. TRICUSPID VALVE: Normal valve structure.  There was normal leaflet separation. No obvious mass, vegetation or thrombus noted. DOPPLER: There was trace non-rheumatic regurgitation.  The tricuspid regurgitant jet velocity was obtained and \"clipped\" < 3 m/s, consistent with mild pulmonary hypertension.      AORTA: Normal root. No dissection, aneurysm, or mobile plaque in visualized portions.      PERICARDIUM:  There was a small pericardial effusion. Normal thickness of pericardium.         SUMMARY:  1. Normal LV systolic function, EF 87%. 2. Moderate nonrheumatic mitral valve regurgitation. 3. Mild pulmonary hypertension. 4. Small circumferential pericardial effusion. 5.  No intracardiac thrombus.         Preoperative Diagnosis: As above. Postoperative Diagnosis: As above. Procedure: As above. Surgeon(s) and Role: Derrick Villafuerte MD - Primary    Anesthesia: Iris Judaism.  Estimated Blood Loss: None. Specimens: * No specimens in log *    Findings: As above.   Complications: None.      Signed:   Derrick Villafuerte MD

## 2018-01-20 NOTE — PROCEDURES
[address]  [phone]    Patient ID:  Patient: Iliana Pierre  MRN: [id number]  Age: 81 y.o.  : 1945  Gender: female  Study Date: 2018    History:  This is a female with paroxysmal and symptomatic atrial fibrillation despite therapy with amiodarone, here for invasive EP study and atrial fibrillation ablation.      Procedures Performed:   1. Comprehensive EP study with ablation via pulmonary vein isolation for Afib, transseptal access from R/L atrium (08122)   2. Additional ablation for atrial fibrillation substrate (left atrial roof line) (38867)   3. Additional ablation for an arrhythmia other than atrial fibrillation (anterior mitral isthmus line) (76287)   4. Additional ablation for an arrhythmia other than atrial fibrillation (CTI line) (80400)   5. Use and interpretation of intra cardiac echocardiography (80776-10)   6. Intracardiac electrophysiologic 3-D QERVMER (30476)      Summary:   1. Successful pulmonary vein isolation for treatment of atrial fibrillation with four of four pulmonary vein isolated.  Additional substrate work in the left and right atrium was performed as described below. 2. Antegrade conduction was midline without preexcitation and decremental.  3.  Retrograde conduction was not identified.      Plan:   1. Continue oral anticoagulant (Xarelto). 2. Carafate and proton pump inhibitor for 4 weeks to protect from esophageal injury. 3. Antiarrhythmic therapy (amiodarone) will be tapered in the near future.      Follow up Plan:   1. F/U in office in 2 weeks.      Procedure description:   After consent was obtained, the patient was brought to the EP lab and sedated by general anesthesia by the anesthesiologist who remained in attendance throughout the case.  One sheath was inserted into the left femoral vein and three sheaths into the right femoral vein in the usual fashion without issue.  A deflectable duodecapolar coronary sinus catheter was used for pacing and recording from the left atrium. Initial catheters advanced into the heart under fluoroscopic guidance. A roving catheter was used to pace and record from the right and left atrium, the His bundle location, and the right ventricular apex.      The baseline rhythm was atrial fibrillation.  After ablation therapy, sinus bradycardia was present with first degree AV block (SCL 1061, , QRS 96,  msec).  The AH and HV intervals were 109 and 54 msec, respectively.  Antegrade conduction was midline and decremental without preexcitation.  The WBCL was 540 msec, AVNERP 600/410 and AERP 600/200 msec. Retrograde conduction was not identified. The retrograde WBCL and VAERP could not be determined. The VERP was 600/310 msec.       An intra-cardiac echo probe was advanced into the right atrium and used to visualize the valves, the left atrium, the pulmonary veins, and the fossa ovale.  Under fluoroscopic and echocardiographic guidance, a double transseptal puncture was carried out in the usual fashion using the SL-1 sheath and BRK-1 needle.  An Agilis sheath was exchanged over a wire and the SL-1 sheath was kept during second access. Larna Rdoo was given prior to the first transseptal puncture and boluses were given with a goal ACT of >300 sec.      A deflectable, irrigated ablation catheter with contact-force technology and a 20-pole spiral catheter were advanced into the left atrium.      Using the [employer] three dimensional electroanatomic mapping system, a 3D voltage map was created of the left atrium, all pulmonary veins, and the left atrial appendage.  Later in the case, the right atrium was mapped.      PVI:  Using a spiral-guided approach, in sinus rhythm, a series of ablation lesions were given at the ostia of the four pulmonary veins as appropriate using temperature and power controlled RFA.  This resulted in both entrance and exit block from the pulmonary veins (PVI).     Additional therapy for atrial fibrillation substrate:  Using a electrogram and 3D mapping as a guide, a series of ablation lesions were given at the left atrial roof line using temperature and power controlled RFA.  This block along the left atrial roof line as evidenced of either double potentials or electrogram reduction in the region of the line.  Bidirectional block was determined with differential pacing.       Additional therapy for additional arrhythmia mechanism than Afib:  the anterior mitral isthmus line was made using radiofrequency ablation of the anterior mitral isthmus using linear lesioning from the left atrial roof line anterior to the left atrial appendage and down to the mitral annulus.  Block along the line except at the most superior portion showed slow conduction. No sustained arrhythmias were inducible.      Additional therapy for additional arrhythmia mechanism than Afib:  The cavotricuspid isthmus was targeted next for ablation of right atrial flutter after typical RA flutter was induced with rapid atrial pacing.  During ablation, both temperature and power-limited energy was used to create block and the tachycardia terminated.  Bidirectional block was confirmed with differential pacing across the line.      After the catheters were withdrawn into the right atrium, heparin infusion was discontinued.  The heparin was reversed with a test dose of protamine followed by the full dose to a total of 60 mg.      Once the ablation lesion set was completed, the patient remained in normal sinus rhythm.  The catheters were withdrawn to the inferior vena cava.      Repeat scanning with the intracardiac ultrasound did not show any significant pericardial effusion accumulation. The patient was extubated after sheaths removed and sent to the recovery area.          Preoperative Diagnosis: As above. Postoperative Diagnosis: As above.   Procedure: As above.  Surgeon(s) and Role: Darline Ignacio MD - Primary   Anesthesia: Gabrielle Ayon.  Estimated Blood Loss: 40 cc. Specimens: * No specimens in log *   Findings: As above.   Complications: None.      Ablation therapy:  46 treatments totalling 70 minutes of RFA  X-ray time: 22.9 minutes      Signed:  Klaudia Goodwin MD

## 2018-05-04 ENCOUNTER — APPOINTMENT (OUTPATIENT)
Dept: ULTRASOUND IMAGING | Age: 73
End: 2018-05-04
Attending: PHYSICIAN ASSISTANT
Payer: MEDICARE

## 2018-05-04 ENCOUNTER — HOSPITAL ENCOUNTER (EMERGENCY)
Age: 73
Discharge: HOME | End: 2018-05-04
Attending: EMERGENCY MEDICINE
Payer: MEDICARE

## 2018-05-04 ENCOUNTER — APPOINTMENT (OUTPATIENT)
Dept: GENERAL RADIOLOGY | Age: 73
End: 2018-05-04
Attending: PHYSICIAN ASSISTANT
Payer: MEDICARE

## 2018-05-04 VITALS
HEIGHT: 68 IN | TEMPERATURE: 99 F | OXYGEN SATURATION: 100 % | RESPIRATION RATE: 16 BRPM | WEIGHT: 274 LBS | HEART RATE: 67 BPM | BODY MASS INDEX: 41.52 KG/M2 | DIASTOLIC BLOOD PRESSURE: 67 MMHG | SYSTOLIC BLOOD PRESSURE: 137 MMHG

## 2018-05-04 DIAGNOSIS — L03.116 CELLULITIS OF LEFT FOOT: Primary | ICD-10-CM

## 2018-05-04 PROCEDURE — 74011250637 HC RX REV CODE- 250/637: Performed by: PHYSICIAN ASSISTANT

## 2018-05-04 PROCEDURE — 93971 EXTREMITY STUDY: CPT

## 2018-05-04 PROCEDURE — 73630 X-RAY EXAM OF FOOT: CPT

## 2018-05-04 PROCEDURE — 99284 EMERGENCY DEPT VISIT MOD MDM: CPT

## 2018-05-04 RX ORDER — CEPHALEXIN 500 MG/1
500 CAPSULE ORAL 4 TIMES DAILY
Qty: 40 CAP | Refills: 0 | Status: SHIPPED | OUTPATIENT
Start: 2018-05-04 | End: 2018-05-14

## 2018-05-04 RX ORDER — HYDROCODONE BITARTRATE AND ACETAMINOPHEN 5; 325 MG/1; MG/1
2 TABLET ORAL
Status: COMPLETED | OUTPATIENT
Start: 2018-05-04 | End: 2018-05-04

## 2018-05-04 RX ORDER — CEPHALEXIN 250 MG/1
500 CAPSULE ORAL
Status: COMPLETED | OUTPATIENT
Start: 2018-05-04 | End: 2018-05-04

## 2018-05-04 RX ORDER — OXYCODONE AND ACETAMINOPHEN 5; 325 MG/1; MG/1
1 TABLET ORAL
Qty: 15 TAB | Refills: 0 | Status: SHIPPED | OUTPATIENT
Start: 2018-05-04 | End: 2019-09-23

## 2018-05-04 RX ADMIN — HYDROCODONE BITARTRATE AND ACETAMINOPHEN 2 TABLET: 5; 325 TABLET ORAL at 20:12

## 2018-05-04 RX ADMIN — CEPHALEXIN 500 MG: 250 CAPSULE ORAL at 22:02

## 2018-05-04 NOTE — ED NOTES
Patient wheeled to bed, able to transfer to bed by own power. Patient reports having swelling and pain in her left foot starting up Wednesday night. Patient states she soaked her foot yesterday and the redness has decreased on the medial side, but on top and to the lateral side of the foot she is still having swelling. Patient reports taking tylenol with minor relief. Patient updated on plan of care and call bell within reach. Daughter at bedside.

## 2018-05-05 NOTE — ED PROVIDER NOTES
EMERGENCY DEPARTMENT HISTORY AND PHYSICAL EXAM      Date: 5/4/2018  Patient Name: Gene Garcia    History of Presenting Illness     Chief Complaint   Patient presents with    Foot Pain     left foot pain for a couple of days. No known injury. Just became red and swollen. Not as painful today but she cannot bear any weight on it. History Provided By: Patient    HPI: Gene Garcia, 81 y.o. female with PMHx significant for arthritis, migraines, and a-fib presents ambulatory to the ED with cc of progressively worsening redness and swelling of her left foot x 2 days. Pt also c/o left foot pain. Her associated pain is moderate. Her pain is described as a throbbing sensation. She reports no injury to her foot. Pt explains her pain is worse with bearing weight. She has tried Tylenol with no relief. Pt is currently on Xarelto. She has recently been seen by her cardiologist who started her on Metoprolol. Pt reports no calf pain with her symptoms. She is otherwise without complaint. Chief Complaint: left foot redness and swelling  Duration: 2 Days  Timing:  Progressive  Location: left foot  Quality: throbbing  Severity: Moderate  Modifying Factors: worse with bearing weight  Associated Symptoms: left foot pain    There are no other complaints, changes, or physical findings at this time. PCP: Patricio Wick MD    Current Outpatient Prescriptions   Medication Sig Dispense Refill    METOPROLOL SUCCINATE PO Take 25 mg by mouth.  cephALEXin (KEFLEX) 500 mg capsule Take 1 Cap by mouth four (4) times daily for 10 days. 40 Cap 0    oxyCODONE-acetaminophen (PERCOCET) 5-325 mg per tablet Take 1 Tab by mouth every four (4) hours as needed for Pain. Max Daily Amount: 6 Tabs. 15 Tab 0    hydroCHLOROthiazide (HYDRODIURIL) 25 mg tablet Take 25 mg by mouth daily.  rivaroxaban (XARELTO) 20 mg tab tablet Take 20 mg by mouth daily.       pantoprazole (PROTONIX) 40 mg tablet Take 1 Tab by mouth Daily (before breakfast). 30 Tab 0    amiodarone (CORDARONE) 200 mg tablet Take 200 mg by mouth two (2) times a day.  ondansetron (ZOFRAN ODT) 8 mg disintegrating tablet Take 1 Tab by mouth every eight (8) hours as needed for Nausea. 15 Tab 0    loratadine (CLARITIN) 10 mg tablet Take 10 mg by mouth.  multivitamin (ONE A DAY) tablet Take 1 Tab by mouth daily. Past History     Past Medical History:  Past Medical History:   Diagnosis Date    Arrhythmia     atrial fibrillation 2015    Arthritis     Ill-defined condition 5/20/2016    COLON-VAGINAL FISTULA PER PATIENT    Neurological disorder     migraines    Sick sinus syndrome [facility])        Past Surgical History:  Past Surgical History:   Procedure Laterality Date    CARDIAC SURG PROCEDURE UNLIST  9/25/2015    CARDIOVERSION    COLONOSCOPY N/A 7/25/2016    [facility]COPY    HX BREAST BIOPSY Right     (Neg) right breast stereotactic biopsy; years ago    HX CHOLECYSTECTOMY      HX GI      COLONOSCOPIES    HX GYN      partial hysterectomy    HX KNEE ARTHROSCOPY      RIGHT KNEE    HX ORTHOPAEDIC      BILATERAL GREAT TOE FUSIONS    HX TONSILLECTOMY         Family History:  Family History   Problem Relation Age of Onset    Diabetes Mother     Heart Disease Father      CHF    Hypertension Brother     Anesth Problems Neg Hx     Asthma Neg Hx     Cancer Neg Hx        Social History:  Social History   Substance Use Topics    Smoking status: Never Smoker    Smokeless tobacco: Never Used    Alcohol use No       Allergies: Allergies   Allergen Reactions    Pcn [Penicillins] Rash    Sulfa (Sulfonamide Antibiotics) Rash         Review of Systems   Review of Systems   Constitutional: Negative for fatigue and fever. HENT: Negative for ear pain and sore throat. Eyes: Negative for pain, redness and visual disturbance. Respiratory: Negative for cough and shortness of breath. Cardiovascular: Negative for chest pain and palpitations. Gastrointestinal: Negative for abdominal pain, nausea and vomiting. Genitourinary: Negative for dysuria, frequency and urgency. Musculoskeletal: Positive for arthralgias (left foot). Negative for back pain, gait problem, neck pain and neck stiffness. + left foot swelling   Skin: Positive for color change (redness of left foot). Negative for rash and wound. Neurological: Negative for dizziness, weakness, light-headedness, numbness and headaches. Physical Exam   Physical Exam   Constitutional: She is oriented to person, place, and time. She appears well-developed and well-nourished. Non-toxic appearance. No distress. HENT:   Head: Normocephalic and atraumatic. Right Ear: External ear normal.   Left Ear: External ear normal.   Nose: Nose normal.   Mouth/Throat: Uvula is midline. No trismus in the jaw. Eyes: Conjunctivae and EOM are normal. Pupils are equal, round, and reactive to light. No scleral icterus. Neck: Normal range of motion and full passive range of motion without pain. Cardiovascular: Normal rate and regular rhythm. Distal pulses are symmetric. Pulmonary/Chest: Effort normal. No accessory muscle usage. No tachypnea. No respiratory distress. She has no decreased breath sounds. She has no wheezes. Abdominal: Soft. There is no tenderness. Musculoskeletal:   Left foot: Tender erythema and mild swelling of the dorsum of the left foot. Redness and swelling is well localized. There is no tenderness, swelling, or redness of the ankle or lower leg. No break in the skin. Neurological: She is alert and oriented to person, place, and time. She is not disoriented. No cranial nerve deficit. GCS eye subscore is 4. GCS verbal subscore is 5. GCS motor subscore is 6. Skin: Skin is intact. No rash noted. Psychiatric: She has a normal mood and affect.  Her speech is normal.   Nursing note and vitals reviewed. Diagnostic Study Results     Radiologic Studies -   DUPLEX LOWER EXT VENOUS LEFT   Final Result   INDICATION:  Leg swelling, pain, DVT suspected     COMPARISON: None.     FINDINGS: Duplex Doppler sonography of the left lower extremity was performed  from the groin to the calf. The left common femoral, femoral and popliteal veins  are compressible with normal color-flow and wave forms and response to  physiologic maneuvers including Valsalva and augmentation.     IMPRESSION  IMPRESSION:  No deep venous thrombosis identified. XR FOOT LT MIN 3 V   Final Result   EXAM:  XR FOOT LT MIN 3 V     INDICATION:   pain; swelling.     COMPARISON:  None.     FINDINGS:  Three views of the left foot demonstrate no acute fracture. 2 screws  overlie the left first metatarsal phalangeal joint which is fused. There are  slight to mild degenerative changes DIP joints. .  The soft tissues are within  normal limits.     IMPRESSION  IMPRESSION:  No acute abnormality. Medical Decision Making   I am the first provider for this patient. I reviewed the vital signs, available nursing notes, past medical history, past surgical history, family history and social history. Vital Signs-Reviewed the patient's vital signs. Patient Vitals for the past 12 hrs:   Temp Pulse Resp BP SpO2   05/04/18 1913 99 °F (37.2 °C) 67 16 137/67 100 %     Records Reviewed: Nursing Notes and Old Medical Records    Provider Notes (Medical Decision Making):   DDx: DVT, cellulitis, fracture     Plain films are negative  US is negative for DVT  Patient is afebrile and well appearing; additional testing deferred  Area of redness is outlined with a skin marker and the daughter takes a picture with her cell phone  Will treat as cellulitis  Begin oral Keflex here in the ED  Follow up in 2 days for a wound check  Strict return precautions    ED Course:   Initial assessment performed.  The patients presenting problems have been discussed, and they are in agreement with the care plan formulated and outlined with them. I have encouraged them to ask questions as they arise throughout their visit. 9:57 PM  Discussed available results with patient. She states she has previously tolerated Keflex without any problems. Disposition:  DISCHARGE NOTE  10:00 PM  The patient has been re-evaluated and is ready for discharge. Reviewed available results with patient. Counseled patient on diagnosis and care plan. Patient has expressed understanding, and all questions have been answered. Patient agrees with plan and agrees to follow up as recommended, or return to the ED if their symptoms worsen. Discharge instructions have been provided and explained to the patient, along with reasons to return to the ED. PLAN:  1. Discharge Medication List as of 5/4/2018 10:03 PM      START taking these medications    Details   cephALEXin (KEFLEX) 500 mg capsule Take 1 Cap by mouth four (4) times daily for 10 days. , Print, Disp-40 Cap, R-0      oxyCODONE-acetaminophen (PERCOCET) 5-325 mg per tablet Take 1 Tab by mouth every four (4) hours as needed for Pain. Max Daily Amount: 6 Tabs., Print, Disp-15 Tab, R-0         CONTINUE these medications which have NOT CHANGED    Details   METOPROLOL SUCCINATE PO Take 25 mg by mouth., Historical Med      hydroCHLOROthiazide (HYDRODIURIL) 25 mg tablet Take 25 mg by mouth daily. , Historical Med      rivaroxaban (XARELTO) 20 mg tab tablet Take 20 mg by mouth daily. , Historical Med      pantoprazole (PROTONIX) 40 mg tablet Take 1 Tab by mouth Daily (before breakfast). , Normal, Disp-30 Tab, R-0      amiodarone (CORDARONE) 200 mg tablet Take 200 mg by mouth two (2) times a day., Historical Med      ondansetron (ZOFRAN ODT) 8 mg disintegrating tablet Take 1 Tab by mouth every eight (8) hours as needed for Nausea. , Print, Disp-15 Tab, R-0      loratadine (CLARITIN) 10 mg tablet Take 10 mg by mouth., Historical Med      multivitamin (ONE A DAY) tablet Take 1 Tab by mouth daily. , Historical Med           2. Follow-up Information     Follow up With Details Comments Contact Info    Aaliyah Herrera MD Schedule an appointment as soon as possible for a visit in 2 days PRIMARY CARE: follow up in 2 days for a wound check [address] [place] [address]   [phone]      [facility] EMERGENCY DEPT  AT ONCE for fever, worsening redness or any concerns [address] [place] [address]  [phone]        Return to ED if worse     Diagnosis     Clinical Impression:   1. Cellulitis of left foot        Attestations:    Attestation Note:  This note is prepared by FRANCISCO Tobias, acting as Scribe for Terrence Harrell: The scribe's documentation has been prepared under my direction and personally reviewed by me in its entirety. I confirm that the note above accurately reflects all work, treatment, procedures, and medical decision making performed by me.

## 2018-05-05 NOTE — ED NOTES
Discharge instructions reviewed with patient. Discharge instructions given to patient per [facility]. Patient able to return/verbalize discharge instructions. Copy of discharge instructions given. Patient condition stable, respiratory status within normal limits, neuro status intact. Wheeled out of ER, accompanied by ED Tech.

## 2018-05-25 ENCOUNTER — HOSPITAL ENCOUNTER (OUTPATIENT)
Dept: MAMMOGRAPHY | Age: 73
Discharge: HOME | End: 2018-05-25
Attending: FAMILY MEDICINE
Payer: MEDICARE

## 2018-05-25 DIAGNOSIS — Z12.31 VISIT FOR SCREENING MAMMOGRAM: ICD-10-CM

## 2018-05-25 PROCEDURE — 77063 BREAST TOMOSYNTHESIS BI: CPT

## 2019-07-01 ENCOUNTER — HOSPITAL ENCOUNTER (OUTPATIENT)
Dept: MAMMOGRAPHY | Age: 74
Discharge: HOME | End: 2019-07-01
Attending: OBSTETRICS & GYNECOLOGY
Payer: MEDICARE

## 2019-07-01 DIAGNOSIS — Z12.31 ENCOUNTER FOR SCREENING MAMMOGRAM FOR MALIGNANT NEOPLASM OF BREAST: ICD-10-CM

## 2019-07-01 PROCEDURE — 77063 BREAST TOMOSYNTHESIS BI: CPT

## 2019-09-23 RX ORDER — METOPROLOL SUCCINATE 50 MG/1
50 TABLET, EXTENDED RELEASE ORAL
COMMUNITY

## 2019-09-23 RX ORDER — DIGOXIN 125 MCG
0.12 TABLET ORAL
COMMUNITY

## 2019-09-24 ENCOUNTER — ANESTHESIA EVENT (OUTPATIENT)
Dept: ENDOSCOPY | Age: 74
End: 2019-09-24
Payer: MEDICARE

## 2019-09-24 ENCOUNTER — HOSPITAL ENCOUNTER (OUTPATIENT)
Age: 74
Setting detail: OUTPATIENT SURGERY
Discharge: HOME | End: 2019-09-24
Attending: COLON & RECTAL SURGERY | Admitting: COLON & RECTAL SURGERY
Payer: MEDICARE

## 2019-09-24 ENCOUNTER — ANESTHESIA (OUTPATIENT)
Dept: ENDOSCOPY | Age: 74
End: 2019-09-24
Payer: MEDICARE

## 2019-09-24 VITALS
BODY MASS INDEX: 48.95 KG/M2 | OXYGEN SATURATION: 99 % | TEMPERATURE: 97.7 F | HEIGHT: 62 IN | DIASTOLIC BLOOD PRESSURE: 62 MMHG | WEIGHT: 266 LBS | RESPIRATION RATE: 25 BRPM | HEART RATE: 75 BPM | SYSTOLIC BLOOD PRESSURE: 125 MMHG

## 2019-09-24 PROCEDURE — 76060000031 HC ANESTHESIA FIRST 0.5 HR: Performed by: COLON & RECTAL SURGERY

## 2019-09-24 PROCEDURE — 77030010936 HC CLP LIG BSC -C: Performed by: COLON & RECTAL SURGERY

## 2019-09-24 PROCEDURE — 74011000250 HC RX REV CODE- 250: Performed by: ANESTHESIOLOGY

## 2019-09-24 PROCEDURE — 76040000019: Performed by: COLON & RECTAL SURGERY

## 2019-09-24 PROCEDURE — 74011250636 HC RX REV CODE- 250/636: Performed by: COLON & RECTAL SURGERY

## 2019-09-24 PROCEDURE — 74011250636 HC RX REV CODE- 250/636: Performed by: ANESTHESIOLOGY

## 2019-09-24 PROCEDURE — 77030009426 HC FCPS BIOP ENDOSC BSC -B: Performed by: COLON & RECTAL SURGERY

## 2019-09-24 PROCEDURE — 88305 TISSUE EXAM BY PATHOLOGIST: CPT

## 2019-09-24 RX ORDER — FLUMAZENIL 0.1 MG/ML
0.2 INJECTION INTRAVENOUS
Status: DISCONTINUED | OUTPATIENT
Start: 2019-09-24 | End: 2019-09-24 | Stop reason: HOSPADM

## 2019-09-24 RX ORDER — SODIUM CHLORIDE 0.9 % (FLUSH) 0.9 %
5-40 SYRINGE (ML) INJECTION AS NEEDED
Status: DISCONTINUED | OUTPATIENT
Start: 2019-09-24 | End: 2019-09-24 | Stop reason: HOSPADM

## 2019-09-24 RX ORDER — SODIUM CHLORIDE 0.9 % (FLUSH) 0.9 %
5-40 SYRINGE (ML) INJECTION EVERY 8 HOURS
Status: DISCONTINUED | OUTPATIENT
Start: 2019-09-24 | End: 2019-09-24 | Stop reason: HOSPADM

## 2019-09-24 RX ORDER — PROPOFOL 10 MG/ML
INJECTION, EMULSION INTRAVENOUS AS NEEDED
Status: DISCONTINUED | OUTPATIENT
Start: 2019-09-24 | End: 2019-09-24 | Stop reason: HOSPADM

## 2019-09-24 RX ORDER — SODIUM CHLORIDE 9 MG/ML
50 INJECTION, SOLUTION INTRAVENOUS CONTINUOUS
Status: DISCONTINUED | OUTPATIENT
Start: 2019-09-24 | End: 2019-09-24 | Stop reason: HOSPADM

## 2019-09-24 RX ORDER — FENTANYL CITRATE 50 UG/ML
50 INJECTION, SOLUTION INTRAMUSCULAR; INTRAVENOUS
Status: DISCONTINUED | OUTPATIENT
Start: 2019-09-24 | End: 2019-09-24 | Stop reason: HOSPADM

## 2019-09-24 RX ORDER — NALOXONE HYDROCHLORIDE 0.4 MG/ML
0.4 INJECTION, SOLUTION INTRAMUSCULAR; INTRAVENOUS; SUBCUTANEOUS
Status: DISCONTINUED | OUTPATIENT
Start: 2019-09-24 | End: 2019-09-24 | Stop reason: HOSPADM

## 2019-09-24 RX ORDER — LIDOCAINE HYDROCHLORIDE 20 MG/ML
INJECTION, SOLUTION EPIDURAL; INFILTRATION; INTRACAUDAL; PERINEURAL AS NEEDED
Status: DISCONTINUED | OUTPATIENT
Start: 2019-09-24 | End: 2019-09-24 | Stop reason: HOSPADM

## 2019-09-24 RX ORDER — DEXTROMETHORPHAN/PSEUDOEPHED 2.5-7.5/.8
1.2 DROPS ORAL
Status: DISCONTINUED | OUTPATIENT
Start: 2019-09-24 | End: 2019-09-24 | Stop reason: HOSPADM

## 2019-09-24 RX ORDER — ATROPINE SULFATE 0.1 MG/ML
0.5 INJECTION INTRAVENOUS
Status: DISCONTINUED | OUTPATIENT
Start: 2019-09-24 | End: 2019-09-24 | Stop reason: HOSPADM

## 2019-09-24 RX ORDER — EPINEPHRINE 0.1 MG/ML
1 INJECTION INTRACARDIAC; INTRAVENOUS
Status: DISCONTINUED | OUTPATIENT
Start: 2019-09-24 | End: 2019-09-24 | Stop reason: HOSPADM

## 2019-09-24 RX ORDER — MIDAZOLAM HYDROCHLORIDE 1 MG/ML
.25-5 INJECTION, SOLUTION INTRAMUSCULAR; INTRAVENOUS
Status: DISCONTINUED | OUTPATIENT
Start: 2019-09-24 | End: 2019-09-24 | Stop reason: HOSPADM

## 2019-09-24 RX ADMIN — SODIUM CHLORIDE 50 ML/HR: 900 INJECTION, SOLUTION INTRAVENOUS at 14:44

## 2019-09-24 RX ADMIN — PROPOFOL 280 MG: 10 INJECTION, EMULSION INTRAVENOUS at 15:08

## 2019-09-24 RX ADMIN — LIDOCAINE HYDROCHLORIDE 40 MG: 20 INJECTION, SOLUTION EPIDURAL; INFILTRATION; INTRACAUDAL; PERINEURAL at 14:47

## 2019-09-24 NOTE — H&P
Colon and Rectal Surgery History and Physical    Subjective: Francisco Shields is a 69 y.o. 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     Past Medical History:   Diagnosis Date    Arrhythmia     atrial fibrillation 2015    Arthritis     Ill-defined condition 5/20/2016    COLON-VAGINAL FISTULA PER PATIENT    Neurological disorder     migraines    PUD (peptic ulcer disease)     in the 29's    Sick sinus syndrome [facility])       Past Surgical History:   Procedure Laterality Date    CARDIAC SURG PROCEDURE UNLIST  9/25/2015    CARDIOVERSION    COLONOSCOPY N/A 7/25/2016    [facility] ENDOSCOPY    HX BREAST BIOPSY Right     (Neg) right breast stereotactic biopsy; years ago    HX CHOLECYSTECTOMY      HX GI      COLONOSCOPIES    HX GYN      partial hysterectomy    HX KNEE ARTHROSCOPY      RIGHT KNEE    HX ORTHOPAEDIC      BILATERAL GREAT TOE FUSIONS    HX ORTHOPAEDIC Bilateral 2009, 2010    carpal tunnel     HX TONSILLECTOMY        Social History     Tobacco Use    Smoking status: Never Smoker    Smokeless tobacco: Never Used   Substance Use Topics    Alcohol use: No      Family History   Problem Relation Age of Onset    Diabetes Mother     Heart Disease Father         CHF    Hypertension Brother     Anesth Problems Neg Hx     Asthma Neg Hx     Cancer Neg Hx       Prior to Admission medications    Medication Sig Start Date End Date Taking? Authorizing Provider   metoprolol succinate (TOPROL-XL) 50 mg XL tablet Take 50 mg by mouth nightly. Yes Provider, Historical   digoxin (LANOXIN) 0.125 mg tablet Take 0.125 mg by mouth nightly. Yes Provider, Historical   folic acid/multivit-min/lutein (CENTRUM SILVER PO) Take 1 Tab by mouth daily.    Yes Provider, Historical   hydroCHLOROthiazide (HYDRODIURIL) 25 mg tablet Take 25 mg by mouth daily. Yes Provider, Historical   loratadine (CLARITIN) 10 mg tablet Take 10 mg by mouth nightly. Yes Other, MD Gary   multivitamin (ONE A DAY) tablet Take 1 Tab by mouth daily. Yes Other, MD Gary   rivaroxaban (XARELTO) 20 mg tab tablet Take 20 mg by mouth daily. Yes Provider, Historical     Allergies   Allergen Reactions    Pcn [Penicillins] Rash    Sulfa (Sulfonamide Antibiotics) Rash        Review of Systems:    A comprehensive review of systems was negative except for that written in the History of Present Illness. Objective:     Visit Vitals  /73   Pulse 93   Temp 97.8 °F (36.6 °C)   Resp 16   Ht 5' 2\" (1.575 m)   Wt 120.7 kg (266 lb)   SpO2 98%   Breastfeeding? No   BMI 48.65 kg/m²        Physical Exam:   General:  Alert, cooperative, no distress, appears stated age. Head:  Normocephalic, without obvious abnormality, atraumatic. Eyes:  Conjunctivae/corneas clear. PERRL, EOMs intact. Ears:  Normal external ear canals both ears. Nose: Nares normal. Septum midline. No drainage. Throat: Lips, mucosa, and tongue normal. Teeth and gums normal.   Neck: Supple, symmetrical, trachea midline, no adenopathy   Back:   Symmetric, no curvature. ROM normal.   Lungs:   Clear   Chest wall:  No tenderness or deformity. Heart:  Regular rate and rhythm       Abdomen:   Soft, non-tender. Bowel sounds normal. No masses,  No organomegaly. Genitalia:  deferred       Extremities: Extremities normal, atraumatic, no cyanosis or edema. Skin: Skin color, texture, turgor normal. No rashes or lesions. Neurologic: CNII-XII intact. Normal strength, sensation and reflexes throughout. Imaging:  images and reports reviewed    Lab Review:  No results found for this or any previous visit (from the past 24 hour(s)). Labs and radiology: images and reports reviewed      Assessment:     Hx colonic fistula    Plan:     1.  I recommend proceeding with colonoscopy. Treatment alternatives were discussed. 2. Discussed aspects of surgical intervention, methods, risks (including by not limited to infection, bleeding, hematoma, and perforation of the intestines or solid organs), and the risks of general anesthetic. The patient understands the risks; any and all questions were answered to the patient's satisfaction.     Signed By: Valri Gosselin, MD     September 24, 2019

## 2019-09-24 NOTE — ANESTHESIA PREPROCEDURE EVALUATION
Anesthetic History   No history of anesthetic complications            Review of Systems / Medical History  Patient summary reviewed, nursing notes reviewed and pertinent labs reviewed    Pulmonary  Within defined limits                 Neuro/Psych         Headaches    Comments: Migraines Cardiovascular            Dysrhythmias : atrial fibrillation      Exercise tolerance: <4 METS  Comments: H/O Sick Sinus Syndrome      GI/Hepatic/Renal           PUD    Comments: H/O Colon Polyps Endo/Other        Morbid obesity and arthritis     Other Findings   Comments: H/O Colovaginal fistula           Physical Exam    Airway  Mallampati: III  TM Distance: > 6 cm  Neck ROM: normal range of motion   Mouth opening: Normal     Cardiovascular  Regular rate and rhythm,  S1 and S2 normal,  no murmur, click, rub, or gallop             Dental  No notable dental hx       Pulmonary  Breath sounds clear to auscultation               Abdominal  GI exam deferred       Other Findings            Anesthetic Plan    ASA: 3  Anesthesia type: general and total IV anesthesia          Induction: Intravenous  Anesthetic plan and risks discussed with: Patient      Propofol MAC

## 2019-09-24 NOTE — BRIEF OP NOTE
BRIEF OPERATIVE NOTE    Date of Procedure: 9/24/2019   Preoperative Diagnosis: PERSONAL HISTORY POLYPS  Postoperative Diagnosis: polyps, hemorrhoids    Procedure(s):  COLONOSCOPY  ENDOSCOPIC POLYPECTOMY  RESOLUTION CLIP  Surgeon(s) and Role:     * Marilyn Boyce MD - Primary         Surgical Assistant:     Surgical Staff:  Endoscopy Technician-1: Thomas Funes  Endoscopy RN-1: Annabelle Davison RN  No case tracking events are documented in the log. Anesthesia: MAC   Estimated Blood Loss: 1cc  Specimens:   ID Type Source Tests Collected by Time Destination   1 : ascending colon polyps X 4 Preservative   Marilyn Boyce MD 9/24/2019 1454 Pathology      Findings: multiple polyps.  Normal colorectal anastomosis, hemorrhoids   Complications: none apparent  Implants: * No implants in log *

## 2019-09-24 NOTE — PERIOP NOTES
Anesthesia reports 280mg Propofol, 40mg Lidocaine and 750mL NS given during procedure. Received report from anesthesia staff on vital signs and status of patient.

## 2019-09-24 NOTE — OP NOTES
Colonoscopy Procedure Note    Indications: Previous adenomatous polyp    Anesthesia/Sedation: MAC anesthesia Propofol    Pre-Procedure Exam:  Airway: clear   Heart: normal S1and S2    Lungs: clear bilateral  Abdomen: soft, nontender, bowel sounds present and normal in all quadrants   Mental Status: awake, alert, and oriented to person, place, and time      Procedure in Detail:  Informed consent was obtained for the procedure, including sedation. Risks of perforation, hemorrhage, adverse drug reaction, and aspiration were discussed. The patient was placed in the left lateral decubitus position. Based on the pre-procedure assessment, including review of the patient's medical history, medications, allergies, and review of systems, she had been deemed to be an appropriate candidate for moderate sedation; she was therefore sedated with the medications listed above. The patient was monitored continuously with ECG tracing, pulse oximetry, blood pressure monitoring, and direct observations. A rectal examination was performed. The [id number] was inserted into the rectum and advanced under direct vision to the cecum, which was identified by the ileocecal valve and appendiceal orifice. The quality of the colonic preparation was excellent. A careful inspection was made as the colonoscope was withdrawn, including a retroflexed view of the rectum; findings and interventions are described below. Appropriate photodocumentation was obtained. Findings:   Rectum:     - Protruding lesions:     -Internal Hemorrhoids and     -normal colorectal anastomosis    - normal colorectal anastomosis  Sigmoid:     - surgically absent  Descending Colon:   Normal  Transverse Colon:   Normal  Ascending Colon:     - Protruding lesions:     -Pedunculated Polyp(s) size 5-7 mm removed by polypectomy (hot biopsy) and resolution clip placement  Cecum:   Normal          Specimens: Specimens were collected and sent to pathology.        EBL: Minimal    Complications: None; patient tolerated the procedure well. Attending Attestation: I performed the procedure.     Recommendations:    - repeat colonoscopy in 1 year

## 2019-09-24 NOTE — ANESTHESIA POSTPROCEDURE EVALUATION
Procedure(s):  COLONOSCOPY  ENDOSCOPIC POLYPECTOMY  RESOLUTION CLIP.    total IV anesthesia    Anesthesia Post Evaluation        Patient location during evaluation: PACU  Note status: Adequate. Level of consciousness: responsive to verbal stimuli and sleepy but conscious  Pain management: satisfactory to patient  Airway patency: patent  Anesthetic complications: no  Cardiovascular status: acceptable  Respiratory status: acceptable  Hydration status: acceptable  Comments: +Post-Anesthesia Evaluation and Assessment    Patient: Jluis Burns MRN: [id number]  SSN: [id number]   YOB: 1945  Age: 69 y.o. Sex: female      Cardiovascular Function/Vital Signs    /73   Pulse 75   Temp 36.5 °C (97.7 °F)   Resp 25   Ht 5' 2\" (1.575 m)   Wt 120.7 kg (266 lb)   SpO2 99%   Breastfeeding? No   BMI 48.65 kg/m²     Patient is status post Procedure(s):  COLONOSCOPY  ENDOSCOPIC POLYPECTOMY  RESOLUTION CLIP. Nausea/Vomiting: Controlled. Postoperative hydration reviewed and adequate. Pain:  Pain Scale 1: Numeric (0 - 10) (09/24/19 1528)  Pain Intensity 1: 0 (09/24/19 1528)   Managed. Neurological Status: At baseline. Mental Status and Level of Consciousness: Arousable. Pulmonary Status:   O2 Device: Room air (09/24/19 1528)   Adequate oxygenation and airway patent. Complications related to anesthesia: None    Post-anesthesia assessment completed. No concerns. Signed By: Carolanne Hamman, MD    9/24/2019  Post anesthesia nausea and vomiting:  controlled      Vitals Value Taken Time   /62 9/24/2019  3:29 PM   Temp 36.5 °C (97.7 °F) 9/24/2019  3:16 PM   Pulse 75 9/24/2019  3:31 PM   Resp 25 9/24/2019  3:31 PM   SpO2 99 % 9/24/2019  3:31 PM   Vitals shown include unvalidated device data.

## 2020-08-28 ENCOUNTER — HOSPITAL ENCOUNTER (OUTPATIENT)
Dept: MAMMOGRAPHY | Age: 75
Discharge: HOME | End: 2020-08-28
Attending: OBSTETRICS & GYNECOLOGY
Payer: MEDICARE

## 2020-08-28 DIAGNOSIS — Z12.31 VISIT FOR SCREENING MAMMOGRAM: ICD-10-CM

## 2020-08-28 PROCEDURE — 77063 BREAST TOMOSYNTHESIS BI: CPT

## 2020-10-09 ENCOUNTER — HOSPITAL ENCOUNTER (OUTPATIENT)
Dept: PREADMISSION TESTING | Age: 75
Discharge: HOME | End: 2020-10-09
Payer: MEDICARE

## 2020-10-09 PROCEDURE — 87635 SARS-COV-2 COVID-19 AMP PRB: CPT

## 2020-10-10 LAB
HEALTH STATUS, XMCV2T: NORMAL
SARS-COV-2, COV2NT: NOT DETECTED
SOURCE, COVRS: NORMAL
SPECIMEN SOURCE, FCOV2M: NORMAL
SPECIMEN TYPE, XMCV1T: NORMAL

## 2020-10-13 ENCOUNTER — HOSPITAL ENCOUNTER (OUTPATIENT)
Age: 75
Setting detail: OUTPATIENT SURGERY
Discharge: HOME | End: 2020-10-13
Attending: COLON & RECTAL SURGERY | Admitting: COLON & RECTAL SURGERY
Payer: MEDICARE

## 2020-10-13 ENCOUNTER — ANESTHESIA EVENT (OUTPATIENT)
Dept: ENDOSCOPY | Age: 75
End: 2020-10-13
Payer: MEDICARE

## 2020-10-13 ENCOUNTER — ANESTHESIA (OUTPATIENT)
Dept: ENDOSCOPY | Age: 75
End: 2020-10-13
Payer: MEDICARE

## 2020-10-13 VITALS
HEART RATE: 89 BPM | TEMPERATURE: 97.6 F | HEIGHT: 62 IN | WEIGHT: 255 LBS | RESPIRATION RATE: 18 BRPM | DIASTOLIC BLOOD PRESSURE: 79 MMHG | SYSTOLIC BLOOD PRESSURE: 110 MMHG | BODY MASS INDEX: 46.93 KG/M2 | OXYGEN SATURATION: 91 %

## 2020-10-13 PROCEDURE — 74011250636 HC RX REV CODE- 250/636: Performed by: COLON & RECTAL SURGERY

## 2020-10-13 PROCEDURE — 74011000250 HC RX REV CODE- 250: Performed by: ANESTHESIOLOGY

## 2020-10-13 PROCEDURE — 77030009426 HC FCPS BIOP ENDOSC BSC -B: Performed by: COLON & RECTAL SURGERY

## 2020-10-13 PROCEDURE — 2709999900 HC NON-CHARGEABLE SUPPLY: Performed by: COLON & RECTAL SURGERY

## 2020-10-13 PROCEDURE — 76040000019: Performed by: COLON & RECTAL SURGERY

## 2020-10-13 PROCEDURE — 74011250636 HC RX REV CODE- 250/636: Performed by: ANESTHESIOLOGY

## 2020-10-13 PROCEDURE — 88305 TISSUE EXAM BY PATHOLOGIST: CPT

## 2020-10-13 PROCEDURE — 76060000031 HC ANESTHESIA FIRST 0.5 HR: Performed by: COLON & RECTAL SURGERY

## 2020-10-13 RX ORDER — NALOXONE HYDROCHLORIDE 0.4 MG/ML
0.4 INJECTION, SOLUTION INTRAMUSCULAR; INTRAVENOUS; SUBCUTANEOUS
Status: DISCONTINUED | OUTPATIENT
Start: 2020-10-13 | End: 2020-10-13 | Stop reason: HOSPADM

## 2020-10-13 RX ORDER — LIDOCAINE HYDROCHLORIDE 20 MG/ML
INJECTION, SOLUTION EPIDURAL; INFILTRATION; INTRACAUDAL; PERINEURAL AS NEEDED
Status: DISCONTINUED | OUTPATIENT
Start: 2020-10-13 | End: 2020-10-13 | Stop reason: HOSPADM

## 2020-10-13 RX ORDER — PROPOFOL 10 MG/ML
INJECTION, EMULSION INTRAVENOUS AS NEEDED
Status: DISCONTINUED | OUTPATIENT
Start: 2020-10-13 | End: 2020-10-13 | Stop reason: HOSPADM

## 2020-10-13 RX ORDER — DEXTROMETHORPHAN/PSEUDOEPHED 2.5-7.5/.8
1.2 DROPS ORAL
Status: DISCONTINUED | OUTPATIENT
Start: 2020-10-13 | End: 2020-10-13 | Stop reason: HOSPADM

## 2020-10-13 RX ORDER — SODIUM CHLORIDE 0.9 % (FLUSH) 0.9 %
5-40 SYRINGE (ML) INJECTION AS NEEDED
Status: DISCONTINUED | OUTPATIENT
Start: 2020-10-13 | End: 2020-10-13 | Stop reason: HOSPADM

## 2020-10-13 RX ORDER — ATROPINE SULFATE 0.1 MG/ML
0.5 INJECTION INTRAVENOUS
Status: DISCONTINUED | OUTPATIENT
Start: 2020-10-13 | End: 2020-10-13 | Stop reason: HOSPADM

## 2020-10-13 RX ORDER — SODIUM CHLORIDE 9 MG/ML
50 INJECTION, SOLUTION INTRAVENOUS CONTINUOUS
Status: DISCONTINUED | OUTPATIENT
Start: 2020-10-13 | End: 2020-10-13 | Stop reason: HOSPADM

## 2020-10-13 RX ORDER — EPINEPHRINE 0.1 MG/ML
1 INJECTION INTRACARDIAC; INTRAVENOUS
Status: DISCONTINUED | OUTPATIENT
Start: 2020-10-13 | End: 2020-10-13 | Stop reason: HOSPADM

## 2020-10-13 RX ORDER — FLUMAZENIL 0.1 MG/ML
0.2 INJECTION INTRAVENOUS
Status: DISCONTINUED | OUTPATIENT
Start: 2020-10-13 | End: 2020-10-13 | Stop reason: HOSPADM

## 2020-10-13 RX ORDER — SODIUM CHLORIDE 0.9 % (FLUSH) 0.9 %
5-40 SYRINGE (ML) INJECTION EVERY 8 HOURS
Status: DISCONTINUED | OUTPATIENT
Start: 2020-10-13 | End: 2020-10-13 | Stop reason: HOSPADM

## 2020-10-13 RX ADMIN — PROPOFOL 260 MG: 10 INJECTION, EMULSION INTRAVENOUS at 13:17

## 2020-10-13 RX ADMIN — SODIUM CHLORIDE 50 ML/HR: 900 INJECTION, SOLUTION INTRAVENOUS at 12:32

## 2020-10-13 RX ADMIN — LIDOCAINE HYDROCHLORIDE 40 MG: 20 INJECTION, SOLUTION EPIDURAL; INFILTRATION; INTRACAUDAL; PERINEURAL at 13:01

## 2020-10-13 NOTE — H&P
Colon and Rectal Surgery History and Physical    Subjective: Maricruz Parekh is a 70 y.o. female who has hx louise, hx colovaginal fistula. 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 Brandi Prabhakar COLONOSCOPY N/A 7/25/2016    COLONOSCOPY performed by Juan Bronson MD at [address] COLONOSCOPY N/A 9/24/2019    COL[facility]COPY performed by [facility] at [facility] ENDOSCOPY    HX BREAST BIOPSY Right     (Neg) right breast stereotactic biopsy; years ago    HX CHOLECYSTECTOMY      HX GI  08/08/2016    colon surgery, sigmoid colectomy with vaginal fistula    HX GYN      partial hysterectomy    HX KNEE ARTHROSCOPY      RIGHT KNEE    HX ORTHOPAEDIC      BILATERAL GREAT TOE FUSIONS    HX ORTHOPAEDIC Bilateral 2009, 2010    carpal tunnel     HX TONSILLECTOMY        Social History     Tobacco Use    Smoking status: Never Smoker    Smokeless tobacco: Never Used   Substance Use Topics    Alcohol use: No      Family History   Problem Relation Age of Onset    Diabetes Mother     Heart Disease Father         CHF    Hypertension Brother     Anesth Problems Neg Hx     Asthma Neg Hx     Cancer Neg Hx       Prior to Admission medications    Medication Sig Start Date End Date Taking? Authorizing Provider   metoprolol succinate (TOPROL-XL) 50 mg XL tablet Take 50 mg by mouth nightly.    Yes Provider, Historical   digoxin (LANOXIN) 0.125 mg tablet Take 0.125 mg by mouth nightly. Yes Provider, Historical   folic acid/multivit-min/lutein (CENTRUM SILVER PO) Take 1 Tab by mouth daily. Yes Provider, Historical   hydroCHLOROthiazide (HYDRODIURIL) 25 mg tablet Take 25 mg by mouth daily. Yes Provider, Historical   loratadine (CLARITIN) 10 mg tablet Take 10 mg by mouth nightly. Yes Other, MD Gary   rivaroxaban (XARELTO) 20 mg tab tablet Take 20 mg by mouth daily. Provider, Historical     Allergies   Allergen Reactions    Pcn [Penicillins] Rash    Sulfa (Sulfonamide Antibiotics) Rash        Review of Systems:    A comprehensive review of systems was negative except for that written in the History of Present Illness. Objective:     Visit Vitals  /77   Pulse 96   Temp 98.3 °F (36.8 °C)   Resp 17   Ht 5' 2\" (1.575 m)   Wt 115.7 kg (255 lb)   SpO2 95%   Breastfeeding No   BMI 46.64 kg/m²        Physical Exam:   General:  Alert, cooperative, no distress, appears stated age. Head:  Normocephalic, without obvious abnormality, atraumatic. Eyes:  Conjunctivae/corneas clear. PERRL, EOMs intact. Ears:  Normal external ear canals both ears. Nose: Nares normal. Septum midline. No drainage. Throat: Lips, mucosa, and tongue normal. Teeth and gums normal.   Neck: Supple, symmetrical, trachea midline, no adenopathy   Back:   Symmetric, no curvature. ROM normal.   Lungs:   Clear   Chest wall:  No tenderness or deformity. Heart:  Regular rate and rhythm       Abdomen:   Soft, non-tender. Bowel sounds normal. No masses,  No organomegaly. Genitalia:  deferred       Extremities: Extremities normal, atraumatic, no cyanosis or edema. Skin: Skin color, texture, turgor normal. No rashes or lesions. Neurologic: CNII-XII intact. Normal strength, sensation and reflexes throughout. Imaging:  images and reports reviewed    Lab Review:  No results found for this or any previous visit (from the past 24 hour(s)).     Labs and radiology: images and reports reviewed      Assessment:     Hx louise    Plan:     1. I recommend proceeding with colonoscopy. Treatment alternatives were discussed. 2. Discussed aspects of surgical intervention, methods, risks (including by not limited to infection, bleeding, hematoma, and perforation of the intestines or solid organs), and the risks of general anesthetic. The patient understands the risks; any and all questions were answered to the patient's satisfaction.     Signed By: Adalid Grimm MD     October 13, 2020

## 2020-10-13 NOTE — ANESTHESIA POSTPROCEDURE EVALUATION
Procedure(s):  COLONOSCOPY  ENDOSCOPIC POLYPECTOMY. total IV anesthesia    Anesthesia Post Evaluation        Patient location during evaluation: PACU  Note status: Adequate. Level of consciousness: responsive to verbal stimuli and sleepy but conscious  Pain management: satisfactory to patient  Airway patency: patent  Anesthetic complications: no  Cardiovascular status: acceptable  Respiratory status: acceptable  Hydration status: acceptable  Comments: +Post-Anesthesia Evaluation and Assessment    Patient: Keyur Sutton MRN: [id number]  [id number]: [id number]   YOB: 1945  Age: 70 y.o. Sex: female      Cardiovascular Function/Vital Signs    /79   Pulse 89   Temp 36.4 °C (97.6 °F)   Resp 18   Ht 5' 2\" (1.575 m)   Wt 115.7 kg (255 lb)   SpO2 91%   Breastfeeding No   BMI 46.64 kg/m²     Patient is status post Procedure(s):  COLONOSCOPY  ENDOSCOPIC POLYPECTOMY. Nausea/Vomiting: Controlled. Postoperative hydration reviewed and adequate. Pain:  Pain Scale 1: Numeric (0 - 10) (10/13/20 1333)  Pain Intensity 1: 0 (10/13/20 1333)   Managed. Neurological Status: At baseline. Mental Status and Level of Consciousness: Arousable. Pulmonary Status:   O2 Device: Room air (10/13/20 1333)   Adequate oxygenation and airway patent. Complications related to anesthesia: None    Post-anesthesia assessment completed. No concerns. Signed By: David Adamson DO    10/13/2020  Post anesthesia nausea and vomiting:  controlled      INITIAL Post-op Vital signs:   Vitals Value Taken Time   /79 10/13/2020  1:40 PM   Temp 36.4 °C (97.6 °F) 10/13/2020  1:33 PM   Pulse 90 10/13/2020  1:55 PM   Resp 15 10/13/2020  1:55 PM   SpO2 93 % 10/13/2020  1:51 PM   Vitals shown include unvalidated device data.

## 2020-10-13 NOTE — OP NOTES
Colonoscopy Procedure Note    Indications: Previous adenomatous polyp    Anesthesia/Sedation: MAC anesthesia    Pre-Procedure Exam:  Airway: clear   Heart: normal S1and S2    Lungs: clear bilateral  Abdomen: soft, nontender, bowel sounds present and normal in all quadrants   Mental Status: awake, alert, and oriented to person, place, and time      Procedure in Detail:  Informed consent was obtained for the procedure, including sedation. Risks of perforation, hemorrhage, adverse drug reaction, and aspiration were discussed. The patient was placed in the left lateral decubitus position. Based on the pre-procedure assessment, including review of the patient's medical history, medications, allergies, and review of systems, she had been deemed to be an appropriate candidate for moderate sedation; she was therefore sedated with the medications listed above. The patient was monitored continuously with ECG tracing, pulse oximetry, blood pressure monitoring, and direct observations. A rectal examination was performed. The [id number] was inserted into the rectum and advanced under direct vision to the cecum, which was identified by the ileocecal valve and appendiceal orifice. The quality of the colonic preparation was excellent. A careful inspection was made as the colonoscope was withdrawn, including a retroflexed view of the rectum; findings and interventions are described below. Appropriate photodocumentation was obtained.     Findings:   Rectum:     - Protruding lesions:     -Pedunculated Polyp(s) size 7 mm removed by polypectomy (hot biopsy)    - normal colorectal anastomosis  Sigmoid:   Normal  Descending Colon:     - Protruding lesions:     -Pedunculated Polyp(s) size 5 mm removed by polypectomy (hot biopsy)  Transverse Colon:   Normal  Ascending Colon:     - Protruding lesions:     -Pedunculated Polyp(s) size 5 mm removed by polypectomy (hot biopsy)  Cecum:   Normal          Specimens: Specimens were collected and sent to pathology. EBL: None    Complications: None; patient tolerated the procedure well. Attending Attestation: I performed the procedure.     Recommendations:    - repeat colonoscopy in 1 year

## 2020-10-13 NOTE — DISCHARGE INSTRUCTIONS
Louis Dsouza  [id number]  1945    COLON DISCHARGE INSTRUCTIONS  Discomfort:  Redness at IV site- apply warm compress to area; if redness or soreness persist- contact your physician  There may be a slight amount of blood passed from the rectum  Gaseous discomfort- walking, belching will help relieve any discomfort  You may not operate a vehicle for 12 hours  You may not engage in an occupation involving machinery or appliances for rest of today  You may not drink alcoholic beverages for at least 12 hours  Avoid making any critical decisions for at least 24 hour  DIET:   High fiber diet. - however -  remember your colon is empty and a heavy meal will produce gas. Avoid these foods:  vegetables, fried / greasy foods, carbonated drinks for today    MEDICATIONS:  Avoid blood thinners for one week       ACTIVITY:  You may resume your normal daily activities it is recommended that you spend the remainder of the day resting -  avoid any strenuous activity. CALL M.D. ANY SIGN OF:   Increasing pain, nausea, vomiting  Abdominal distension (swelling)  New increased bleeding (oral or rectal)  Fever (chills)  Pain in chest area  Bloody discharge from nose or mouth  Shortness of breath     Follow-up Instructions:   Call Ada Dunne MD if any questions or problems. Telephone # [phone]  Biopsy results will be available in  7 to10 days  Should have a repeat colonoscopy in 1 year. COLONOSCOPY FINDINGS:  Your colonoscopy showed: 3 polyps, normal colorectal anastomosis.

## 2020-10-13 NOTE — ROUTINE PROCESS
Lucero Huggins 1945 
[id number] Situation: 
Verbal report received from: [place] Procedure: Procedure(s): 
COLONOSCOPY 
ENDOSCOPIC POLYPECTOMY Background: 
 
Preoperative diagnosis: HISTORY OF POLYPS Postoperative diagnosis: Colon Polyps, Colorectal anastomosis :  Dr. Janell Gomez Assistant(s): Endoscopy RN-1: Dior Friday, RN Endoscopy RN-2: Grabiel Horvath Specimens:  
ID Type Source Tests Collected by Time Destination 1 : Hepatic Flexure Polyp Preservative Hepatic Flexure  Gilda Hardin MD 10/13/2020 1312 Pathology 2 : Descending Colon Polyp Preservative Colon, Descending  Gilda Hardin MD 10/13/2020 1314 Pathology 3 : Rectal Polyp Preservative Rectum  Gilda Hardin MD 10/13/2020 1317 Pathology H. Pylori  no Assessment: 
Intra-procedure medications Anesthesia gave intra-procedure sedation and medications, see anesthesia flow sheet yes Intravenous fluids: NS@ [facility] Vital signs stable Abdominal assessment: round and soft Recommendation: 
Discharge patient per MD order. Return to floor Family or Friend Permission to share finding with family or friend yes

## 2020-10-13 NOTE — ANESTHESIA PREPROCEDURE EVALUATION
Anesthetic History   No history of anesthetic complications            Review of Systems / Medical History  Patient summary reviewed, nursing notes reviewed and pertinent labs reviewed    Pulmonary  Within defined limits                 Neuro/Psych         Headaches (migraines)     Cardiovascular            Dysrhythmias (ablation and cardioversion) : atrial fibrillation and atrial flutter      Exercise tolerance: <4 METS  Comments: SSS listed in chart, pt reports she does NOT have SSS. She says prior to her cardioversion she was in Atrial Flutter and was being evaluated for possible SSS. Her cardiologist told her she did not have SSS and pacemaker not needed.     2018 YUNI: 60% EF, mod MR, mild pulm HTN, no intracardiac thrombus     GI/Hepatic/Renal           PUD    Comments: H/O Colon Polyps    H/O colon-vaginal fistula Endo/Other        Morbid obesity and arthritis     Other Findings            Physical Exam    Airway  Mallampati: III  TM Distance: > 6 cm  Neck ROM: normal range of motion   Mouth opening: Normal     Cardiovascular  Regular rate and rhythm,  S1 and S2 normal,  no murmur, click, rub, or gallop             Dental  No notable dental hx       Pulmonary  Breath sounds clear to auscultation               Abdominal  GI exam deferred       Other Findings            Anesthetic Plan    ASA: 3  Anesthesia type: total IV anesthesia          Induction: Intravenous  Anesthetic plan and risks discussed with: Patient      Took metoprolol last night

## 2021-08-05 ENCOUNTER — TRANSCRIBE ORDER (OUTPATIENT)
Dept: SCHEDULING | Age: 76
End: 2021-08-05

## 2021-08-05 DIAGNOSIS — Z12.31 SCREENING MAMMOGRAM FOR HIGH-RISK PATIENT: Primary | ICD-10-CM

## 2021-09-14 ENCOUNTER — HOSPITAL ENCOUNTER (OUTPATIENT)
Dept: MAMMOGRAPHY | Age: 76
Discharge: HOME | End: 2021-09-14
Attending: OBSTETRICS & GYNECOLOGY
Payer: MEDICARE

## 2021-09-14 DIAGNOSIS — Z12.31 SCREENING MAMMOGRAM FOR HIGH-RISK PATIENT: ICD-10-CM

## 2021-09-14 PROCEDURE — 77063 BREAST TOMOSYNTHESIS BI: CPT

## 2022-02-07 RX ORDER — LANOLIN ALCOHOL/MO/W.PET/CERES
1000 CREAM (GRAM) TOPICAL DAILY
COMMUNITY

## 2022-02-07 RX ORDER — MELATONIN
2000 DAILY
COMMUNITY

## 2022-02-07 RX ORDER — BUMETANIDE 1 MG/1
1 TABLET ORAL DAILY
COMMUNITY

## 2022-02-07 RX ORDER — MENTHOL
1000 GEL (GRAM) TOPICAL DAILY
COMMUNITY
End: 2022-02-07

## 2022-02-08 ENCOUNTER — HOSPITAL ENCOUNTER (OUTPATIENT)
Age: 77
Setting detail: OUTPATIENT SURGERY
Discharge: HOME | End: 2022-02-08
Attending: COLON & RECTAL SURGERY | Admitting: COLON & RECTAL SURGERY
Payer: MEDICARE

## 2022-02-08 ENCOUNTER — ANESTHESIA (OUTPATIENT)
Dept: ENDOSCOPY | Age: 77
End: 2022-02-08
Payer: MEDICARE

## 2022-02-08 ENCOUNTER — ANESTHESIA EVENT (OUTPATIENT)
Dept: ENDOSCOPY | Age: 77
End: 2022-02-08
Payer: MEDICARE

## 2022-02-08 VITALS
SYSTOLIC BLOOD PRESSURE: 123 MMHG | DIASTOLIC BLOOD PRESSURE: 63 MMHG | RESPIRATION RATE: 16 BRPM | HEIGHT: 62 IN | WEIGHT: 259.2 LBS | HEART RATE: 91 BPM | BODY MASS INDEX: 47.7 KG/M2 | TEMPERATURE: 98 F | OXYGEN SATURATION: 94 %

## 2022-02-08 PROCEDURE — 74011250636 HC RX REV CODE- 250/636: Performed by: ANESTHESIOLOGY

## 2022-02-08 PROCEDURE — 74011250636 HC RX REV CODE- 250/636: Performed by: COLON & RECTAL SURGERY

## 2022-02-08 PROCEDURE — 2709999900 HC NON-CHARGEABLE SUPPLY: Performed by: COLON & RECTAL SURGERY

## 2022-02-08 PROCEDURE — 74011000250 HC RX REV CODE- 250: Performed by: ANESTHESIOLOGY

## 2022-02-08 PROCEDURE — 76060000031 HC ANESTHESIA FIRST 0.5 HR: Performed by: COLON & RECTAL SURGERY

## 2022-02-08 PROCEDURE — 77030009426 HC FCPS BIOP ENDOSC BSC -B: Performed by: COLON & RECTAL SURGERY

## 2022-02-08 PROCEDURE — 77030010936 HC CLP LIG BSC -C: Performed by: COLON & RECTAL SURGERY

## 2022-02-08 PROCEDURE — 77030021593 HC FCPS BIOP ENDOSC BSC -A: Performed by: COLON & RECTAL SURGERY

## 2022-02-08 PROCEDURE — 77030013992 HC SNR POLYP ENDOSC BSC -B: Performed by: COLON & RECTAL SURGERY

## 2022-02-08 PROCEDURE — 88305 TISSUE EXAM BY PATHOLOGIST: CPT

## 2022-02-08 PROCEDURE — 76040000019: Performed by: COLON & RECTAL SURGERY

## 2022-02-08 DEVICE — WORKING LENGTH 235CM, WORKING CHANNEL 2.8MM
Type: IMPLANTABLE DEVICE | Site: ASCENDING COLON | Status: FUNCTIONAL
Brand: RESOLUTION 360 CLIP

## 2022-02-08 RX ORDER — DEXTROMETHORPHAN/PSEUDOEPHED 2.5-7.5/.8
20 DROPS ORAL
Status: DISCONTINUED | OUTPATIENT
Start: 2022-02-08 | End: 2022-02-08 | Stop reason: HOSPADM

## 2022-02-08 RX ORDER — DEXTROMETHORPHAN/PSEUDOEPHED 2.5-7.5/.8
DROPS ORAL
Status: DISCONTINUED
Start: 2022-02-08 | End: 2022-02-08 | Stop reason: HOSPADM

## 2022-02-08 RX ORDER — SODIUM CHLORIDE 9 MG/ML
150 INJECTION, SOLUTION INTRAVENOUS CONTINUOUS
Status: DISCONTINUED | OUTPATIENT
Start: 2022-02-08 | End: 2022-02-08 | Stop reason: HOSPADM

## 2022-02-08 RX ORDER — LIDOCAINE HYDROCHLORIDE 20 MG/ML
INJECTION, SOLUTION EPIDURAL; INFILTRATION; INTRACAUDAL; PERINEURAL AS NEEDED
Status: DISCONTINUED | OUTPATIENT
Start: 2022-02-08 | End: 2022-02-08 | Stop reason: HOSPADM

## 2022-02-08 RX ORDER — PROPOFOL 10 MG/ML
INJECTION, EMULSION INTRAVENOUS AS NEEDED
Status: DISCONTINUED | OUTPATIENT
Start: 2022-02-08 | End: 2022-02-08 | Stop reason: HOSPADM

## 2022-02-08 RX ADMIN — LIDOCAINE HYDROCHLORIDE 40 MG: 20 INJECTION, SOLUTION EPIDURAL; INFILTRATION; INTRACAUDAL; PERINEURAL at 15:33

## 2022-02-08 RX ADMIN — PROPOFOL 250 MG: 10 INJECTION, EMULSION INTRAVENOUS at 15:54

## 2022-02-08 RX ADMIN — SODIUM CHLORIDE 150 ML/HR: 9 INJECTION, SOLUTION INTRAVENOUS at 14:42

## 2022-02-08 NOTE — OP NOTES
[phone]    Colonoscopy Procedure Note      Indications: Previous adenomatous polyp    : Nellie Lin MD    Staff: Endoscopy Sandra Lam: [facility]  Endoscopy RN-1: Moses Colón RN    Referring Provider: Sara Hughes MD     Anesthesia/Sedation: MAC provided by Anesthesia    Pre-Procedure Exam:  Airway: clear   Heart: normal S1and S2    Lungs: clear bilateral  Abdomen: soft, nontender, bowel sounds present and normal in all quadrants   Mental Status: awake, alert, and oriented to person, place, and time      Procedure in Detail:  Informed consent was obtained for the procedure, including sedation. Risks of perforation, hemorrhage, adverse drug reaction, and aspiration were discussed. The patient was placed in the left lateral decubitus position. Based on the pre-procedure assessment, including review of the patient's medical history, medications, allergies, and review of systems, he had been deemed to be an appropriate candidate for moderate sedation; he was therefore sedated with the medications listed above. The patient was monitored continuously with ECG tracing, pulse oximetry, blood pressure monitoring, and direct observations. A rectal examination was performed. The [id number] was inserted into the rectum and advanced under direct vision to the cecum, which was identified by the ileocecal valve and appendiceal orifice. The quality of the colonic preparation was excellent. A careful inspection was made as the colonoscope was withdrawn, including a retroflexed view of the rectum; findings and interventions are described below. Appropriate photodocumentation was obtained.     Findings:   Rectum:     - Protruding lesions:     -Pedunculated Polyp(s) size 7 mm removed by polypectomy (snare cautery)  Sigmoid:   Normal  Descending Colon:   Normal  Transverse Colon:   Normal  Ascending Colon:     - Protruding lesions:     -Pedunculated Polyp(s) size 3-7 mm removed by polypectomy (snare cautery)  Cecum:   Normal          Specimens:   ID Type Source Tests Collected by Time Destination   1 : ascending colon polyp  Preservative Colon, Ascending  Matilda Berrios MD 2/8/2022 1543 Pathology   2 : rectal polyp  Preservative Rectum  Matilda Berrios MD 2/8/2022 1553 Pathology        EBL: None    Complications: None; patient tolerated the procedure well. Attending Attestation: I performed the procedure. Recommendations:    - repeat colonoscopy in 1 year    Discharge Disposition: Home in the company of a  when able to ambulate.     Kd Quiñones MD  2/8/2022 3:58 PM

## 2022-02-08 NOTE — DISCHARGE INSTRUCTIONS
Clementine Villa  [id number]  1945    COLON DISCHARGE INSTRUCTIONS  Discomfort:  Redness at IV site- apply warm compress to area; if redness or soreness persist- contact your physician  There may be a slight amount of blood passed from the rectum  Gaseous discomfort- walking, belching will help relieve any discomfort  You may not operate a vehicle for 12 hours  You may not engage in an occupation involving machinery or appliances for rest of today  You may not drink alcoholic beverages for at least 12 hours  Avoid making any critical decisions for at least 24 hour  DIET:   High fiber diet. - however -  remember your colon is empty and a heavy meal will produce gas. Avoid these foods:  vegetables, fried / greasy foods, carbonated drinks for today    MEDICATIONS:  Avoid blood thinners for 5 days       ACTIVITY:  You may resume your normal daily activities it is recommended that you spend the remainder of the day resting -  avoid any strenuous activity. CALL M.D. ANY SIGN OF:   Increasing pain, nausea, vomiting  Abdominal distension (swelling)  New increased bleeding (oral or rectal)  Fever (chills)  Pain in chest area  Bloody discharge from nose or mouth  Shortness of breath     Follow-up Instructions:   Call Mamta Sapp MD if any questions or problems. Telephone # [phone]  Biopsy results will be available in  7 to10 days  Should have a repeat colonoscopy in 1 year. COLONOSCOPY FINDINGS:  Your colonoscopy showed: 4 polyps.

## 2022-02-08 NOTE — PROGRESS NOTES
1550  Endoscope was pre-cleaned at bedside immediately following procedure by Rachael Vasquez. 1600  Received report from anesthesia. Assumed pt care. VSS.

## 2022-02-08 NOTE — ANESTHESIA PREPROCEDURE EVALUATION
Anesthetic History   No history of anesthetic complications            Review of Systems / Medical History  Patient summary reviewed, nursing notes reviewed and pertinent labs reviewed    Pulmonary  Within defined limits                 Neuro/Psych         Headaches (migraines)     Cardiovascular            Dysrhythmias (ablation and cardioversion) : atrial fibrillation and atrial flutter      Exercise tolerance: <4 METS  Comments: SSS listed in chart, pt reports she does NOT have SSS. She says prior to her cardioversion she was in Atrial Flutter and was being evaluated for possible SSS. Her cardiologist told her she did not have SSS and pacemaker not needed.     2018 YUNI: 60% EF, mod MR, mild pulm HTN, no intracardiac thrombus     GI/Hepatic/Renal           PUD    Comments: H/O Colon Polyps    H/O colon-vaginal fistula Endo/Other        Morbid obesity and arthritis     Other Findings              Physical Exam    Airway  Mallampati: III  TM Distance: > 6 cm  Neck ROM: normal range of motion   Mouth opening: Normal     Cardiovascular  Regular rate and rhythm,  S1 and S2 normal,  no murmur, click, rub, or gallop             Dental  No notable dental hx       Pulmonary  Breath sounds clear to auscultation               Abdominal  GI exam deferred       Other Findings            Anesthetic Plan    ASA: 3  Anesthesia type: total IV anesthesia and general          Induction: Intravenous  Anesthetic plan and risks discussed with: Patient

## 2022-02-08 NOTE — ANESTHESIA POSTPROCEDURE EVALUATION
Procedure(s):  COLONOSCOPY  ENDOSCOPIC POLYPECTOMY  COLON BIOPSY  RESOLUTION CLIP.    total IV anesthesia    Anesthesia Post Evaluation        Patient location during evaluation: PACU  Note status: Adequate. Level of consciousness: responsive to verbal stimuli and sleepy but conscious  Pain management: satisfactory to patient  Airway patency: patent  Anesthetic complications: no  Cardiovascular status: acceptable  Respiratory status: acceptable  Hydration status: acceptable  Comments: +Post-Anesthesia Evaluation and Assessment    Patient: Debra Monge MRN: [id number]  SSN: [id number]   YOB: 1945  Age: 78 y.o. Sex: female      Cardiovascular Function/Vital Signs    /63   Pulse 91   Temp 36.7 °C (98 °F)   Resp 16   Ht 5' 2\" (1.575 m)   Wt 117.6 kg (259 lb 3.2 oz)   SpO2 94%   BMI 47.41 kg/m²     Patient is status post Procedure(s):  COLONOSCOPY  ENDOSCOPIC POLYPECTOMY  COLON BIOPSY  RESOLUTION CLIP. Nausea/Vomiting: Controlled. Postoperative hydration reviewed and adequate. Pain:  Pain Scale 1: Numeric (0 - 10) (02/08/22 1622)  Pain Intensity 1: 0 (02/08/22 1622)   Managed. Neurological Status: At baseline. Mental Status and Level of Consciousness: Arousable. Pulmonary Status:   O2 Device: None (Room air) (02/08/22 1622)   Adequate oxygenation and airway patent. Complications related to anesthesia: None    Post-anesthesia assessment completed. No concerns. Signed By: Serena Buckley DO    2/8/2022  Post anesthesia nausea and vomiting:  controlled      INITIAL Post-op Vital signs:   Vitals Value Taken Time   /63 02/08/22 1622   Temp 36.7 °C (98 °F) 02/08/22 1606   Pulse 87 02/08/22 1623   Resp 32 02/08/22 1623   SpO2 97 % 02/08/22 1623   Vitals shown include unvalidated device data.

## 2022-02-08 NOTE — ROUTINE PROCESS
Napoleon Wilson  1945  [id number]    Situation:  Verbal report received from: Aishwarya Gallo  Procedure: Procedure(s):  COLONOSCOPY  ENDOSCOPIC POLYPECTOMY  COLON BIOPSY  RESOLUTION CLIP    Background:    Preoperative diagnosis: HISTORY OF POLYPS  Postoperative diagnosis: polyps    :  Dr. Katherin Narayan  Assistant(s): Endoscopy Technician-1: Dipti June  Endoscopy RN-1: Fer Wang RN    Specimens:   ID Type Source Tests Collected by Time Destination   1 : ascending colon polyp  Preservative Colon, Ascending  Aldo Burch MD 2/8/2022 1543 Pathology   2 : rectal polyp  Preservative Rectum  Aldo Burch MD 2/8/2022 1553 Pathology     H. Pylori  no    Assessment:  Intra-procedure medications       Anesthesia gave intra-procedure sedation and medications, see anesthesia flow sheet yes    Intravenous fluids: NS@ KVO     Vital signs stable     Abdominal assessment: round and soft     Recommendation:  Discharge patient per MD order. Family   Permission to share finding with family or friend yes    Endoscopy discharge instructions have been reviewed and given to patient. The patient verbalized understanding and acceptance of instructions.

## 2022-02-08 NOTE — H&P
Colon and Rectal Surgery History and Physical    Subjective: Dipika Park is a 78 y.o. 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     Past Medical History:   Diagnosis Date    Arrhythmia 09/2015    atrial fibrillation 2015    Arthritis     Ill-defined condition 5/20/2016    COLON-VAGINAL FISTULA PER PATIENT    Migraines     PUD (peptic ulcer disease)     in the 29's    Sick sinus syndrome [facility])       Past Surgical History:   Procedure Laterality Date    COLONOSCOPY N/A 7/25/2016    COL[facility] ENDOSCOPY    COLONOSCOPY N/A 9/24/2019    COLONOSCOPY performed by Tin Abrams MD at [facility]"" ENDOSCOPY    COLONOSCOPY N/A 10/13/2020    COLONOSCOPY performed by Tin Abrams MD at [facility]"" ENDOSCOPY    HX BREAST BIOPSY Right     (Neg) right breast stereotactic biopsy; years ago   [facility] [employer] HX CHOLECYSTECTOMY      HX GI  08/08/2016    colon surgery, sigmoid colectomy with vaginal fistula    HX GYN      partial hysterectomy    HX KNEE ARTHROSCOPY      RIGHT KNEE    HX ORTHOPAEDIC      BILATERAL GREAT TOE FUSIONS    HX ORTHOPAEDIC Bilateral 2009, 2010    carpal tunnel     HX TONSILLECTOMY      ND CARDIAC SURG PROCEDURE UNLIST  9/25/2015    CARDIOVERSION    ND CARDIAC SURG PROCEDURE UNLIST  01/2019    heart ablation by dr. Mauri Medrano History     Tobacco Use    Smoking status: Never Smoker    Smokeless tobacco: Never Used   Substance Use Topics    Alcohol use: No      Family History   Problem Relation Age of Onset    Diabetes Mother     Heart Failure Father     Tuberculosis Father     Thyroid Disease Brother     High Cholesterol Sister     Asthma Sister     Anesth Problems Neg Hx     Cancer Neg Hx       Prior to Admission medications    Medication Sig Start Date End Date Taking?  Authorizing Provider   bumetanide (BUMEX) 1 mg tablet Take 1 mg by mouth daily. Yes Provider, Historical   cholecalciferol (Vitamin D3) (1000 Units /25 mcg) tablet Take 2,000 Units by mouth daily. Yes Provider, Historical   zinc 50 mg tab tablet Take 50 mg by mouth daily. Yes Provider, Historical   metoprolol succinate (TOPROL-XL) 50 mg XL tablet Take 50 mg by mouth nightly. Yes Provider, Historical   digoxin (LANOXIN) 0.125 mg tablet Take 0.125 mg by mouth nightly. Yes Provider, Historical   loratadine (CLARITIN) 10 mg tablet Take 10 mg by mouth nightly. Yes Other, MD Gary   cyanocobalamin 1,000 mcg tablet Take 1,000 mcg by mouth daily. Patient not taking: Reported on 2/8/2022    Provider, Historical   folic acid/multivit-min/lutein (CENTRUM SILVER PO) Take 1 Tab by mouth daily. Provider, Historical   rivaroxaban (XARELTO) 20 mg tab tablet Take 20 mg by mouth daily. Provider, Historical     Allergies   Allergen Reactions    Pcn [Penicillins] Rash    Sulfa (Sulfonamide Antibiotics) Rash        Review of Systems:    A comprehensive review of systems was negative except for that written in the History of Present Illness. Objective:     Visit Vitals  /76   Pulse (!) 114   Temp 98 °F (36.7 °C)   Resp 17   Ht 5' 2\" (1.575 m)   Wt 117.6 kg (259 lb 3.2 oz)   SpO2 96%   BMI 47.41 kg/m²        Physical Exam:   General:  Alert, cooperative, no distress, appears stated age. Head:  Normocephalic, without obvious abnormality, atraumatic. Eyes:  Conjunctivae/corneas clear. PERRL, EOMs intact. Ears:  Normal external ear canals both ears. Nose: Nares normal. Septum midline. No drainage. Throat: Lips, mucosa, and tongue normal. Teeth and gums normal.   Neck: Supple, symmetrical, trachea midline, no adenopathy   Back:   Symmetric, no curvature. ROM normal.   Lungs:   Clear   Chest wall:  No tenderness or deformity. Heart:  Regular rate and rhythm       Abdomen:   Soft, non-tender.  Bowel sounds normal. No masses,  No organomegaly. Genitalia:  deferred       Extremities: Extremities normal, atraumatic, no cyanosis or edema. Skin: Skin color, texture, turgor normal. No rashes or lesions. Neurologic: CNII-XII intact. Normal strength, sensation and reflexes throughout. Imaging:  images and reports reviewed    Lab Review:  No results found for this or any previous visit (from the past 24 hour(s)). Labs and radiology: images and reports reviewed      Assessment:     Hx louise    Plan:     1. I recommend proceeding with colonoscopy. Treatment alternatives were discussed. 2. Discussed aspects of surgical intervention, methods, risks (including by not limited to infection, bleeding, hematoma, and perforation of the intestines or solid organs), and the risks of general anesthetic. The patient understands the risks; any and all questions were answered to the patient's satisfaction.     Signed By: Nellie Lin MD     February 8, 2022

## 2022-03-20 PROBLEM — Z98.890 S/P ABLATION OF ATRIAL FIBRILLATION: Status: ACTIVE | Noted: 2018-01-18

## 2022-03-20 PROBLEM — Z86.79 S/P ABLATION OF ATRIAL FIBRILLATION: Status: ACTIVE | Noted: 2018-01-18

## 2022-09-02 ENCOUNTER — TRANSCRIBE ORDER (OUTPATIENT)
Dept: SCHEDULING | Age: 77
End: 2022-09-02

## 2022-09-02 DIAGNOSIS — Z12.31 VISIT FOR SCREENING MAMMOGRAM: Primary | ICD-10-CM

## 2022-09-29 NOTE — PERIOP NOTES
TRANSFER - OUT REPORT:    Verbal report given to [facility] (name) on Mariana Parks  being transferred to 2164(unit) for routine post - op       Report consisted of patients Situation, Background, Assessment and   Recommendations(SBAR). Information from the following report(s) SBAR, Kardex, OR Summary, Intake/Output, MAR and Cardiac Rhythm SR was reviewed with the receiving nurse. Opportunity for questions and clarification was provided.       Patient transported with:   Monitor  O2 @ 2 liters  Registered Nurse  [facility] O-T Plasty Text: The defect edges were debeveled with a #15 scalpel blade.  Given the location of the defect, shape of the defect and the proximity to free margins an O-T plasty was deemed most appropriate.  Using a sterile surgical marker, an appropriate O-T plasty was drawn incorporating the defect and placing the expected incisions within the relaxed skin tension lines where possible.    The area thus outlined was incised deep to adipose tissue with a #15 scalpel blade.  The skin margins were undermined to an appropriate distance in all directions utilizing iris scissors.

## 2022-10-17 ENCOUNTER — HOSPITAL ENCOUNTER (OUTPATIENT)
Dept: MAMMOGRAPHY | Age: 77
Discharge: HOME | End: 2022-10-17
Attending: OBSTETRICS & GYNECOLOGY
Payer: MEDICARE

## 2022-10-17 DIAGNOSIS — Z12.31 VISIT FOR SCREENING MAMMOGRAM: ICD-10-CM

## 2022-10-17 PROCEDURE — 77063 BREAST TOMOSYNTHESIS BI: CPT

## 2023-02-24 ENCOUNTER — HOSPITAL ENCOUNTER (OUTPATIENT)
Age: 78
Setting detail: OUTPATIENT SURGERY
Discharge: HOME | End: 2023-02-24
Attending: COLON & RECTAL SURGERY | Admitting: COLON & RECTAL SURGERY
Payer: MEDICARE

## 2023-02-24 ENCOUNTER — ANESTHESIA (OUTPATIENT)
Dept: ENDOSCOPY | Age: 78
End: 2023-02-24
Payer: MEDICARE

## 2023-02-24 ENCOUNTER — ANESTHESIA EVENT (OUTPATIENT)
Dept: ENDOSCOPY | Age: 78
End: 2023-02-24
Payer: MEDICARE

## 2023-02-24 VITALS
HEIGHT: 62 IN | BODY MASS INDEX: 48.27 KG/M2 | RESPIRATION RATE: 20 BRPM | OXYGEN SATURATION: 95 % | DIASTOLIC BLOOD PRESSURE: 86 MMHG | TEMPERATURE: 97 F | WEIGHT: 262.3 LBS | HEART RATE: 84 BPM | SYSTOLIC BLOOD PRESSURE: 111 MMHG

## 2023-02-24 PROCEDURE — 2709999900 HC NON-CHARGEABLE SUPPLY: Performed by: COLON & RECTAL SURGERY

## 2023-02-24 PROCEDURE — 77030040684 HC NDL ENDOSC NEEDLEMASTR OCOA -B: Performed by: COLON & RECTAL SURGERY

## 2023-02-24 PROCEDURE — 74011250636 HC RX REV CODE- 250/636: Performed by: NURSE ANESTHETIST, CERTIFIED REGISTERED

## 2023-02-24 PROCEDURE — 88305 TISSUE EXAM BY PATHOLOGIST: CPT

## 2023-02-24 PROCEDURE — 77030038665 HC SOL ELEVIEW INJ AGNT ARPH -B: Performed by: COLON & RECTAL SURGERY

## 2023-02-24 PROCEDURE — 76060000031 HC ANESTHESIA FIRST 0.5 HR: Performed by: COLON & RECTAL SURGERY

## 2023-02-24 PROCEDURE — 77030010936 HC CLP LIG BSC -C: Performed by: COLON & RECTAL SURGERY

## 2023-02-24 PROCEDURE — 74011250636 HC RX REV CODE- 250/636: Performed by: COLON & RECTAL SURGERY

## 2023-02-24 PROCEDURE — 77030013992 HC SNR POLYP ENDOSC BSC -B: Performed by: COLON & RECTAL SURGERY

## 2023-02-24 PROCEDURE — 76040000019: Performed by: COLON & RECTAL SURGERY

## 2023-02-24 DEVICE — WORKING LENGTH 235CM, WORKING CHANNEL 2.8MM
Type: IMPLANTABLE DEVICE | Site: CECUM | Status: FUNCTIONAL
Brand: RESOLUTION 360 CLIP

## 2023-02-24 RX ORDER — NALOXONE HYDROCHLORIDE 0.4 MG/ML
0.4 INJECTION, SOLUTION INTRAMUSCULAR; INTRAVENOUS; SUBCUTANEOUS
Status: DISCONTINUED | OUTPATIENT
Start: 2023-02-24 | End: 2023-02-24 | Stop reason: HOSPADM

## 2023-02-24 RX ORDER — DEXTROMETHORPHAN/PSEUDOEPHED 2.5-7.5/.8
1.2 DROPS ORAL
Status: DISCONTINUED | OUTPATIENT
Start: 2023-02-24 | End: 2023-02-24 | Stop reason: HOSPADM

## 2023-02-24 RX ORDER — PROPOFOL 10 MG/ML
INJECTION, EMULSION INTRAVENOUS AS NEEDED
Status: DISCONTINUED | OUTPATIENT
Start: 2023-02-24 | End: 2023-02-24 | Stop reason: HOSPADM

## 2023-02-24 RX ORDER — EPINEPHRINE 0.1 MG/ML
1 INJECTION INTRACARDIAC; INTRAVENOUS
Status: DISCONTINUED | OUTPATIENT
Start: 2023-02-24 | End: 2023-02-24 | Stop reason: HOSPADM

## 2023-02-24 RX ORDER — FLUMAZENIL 0.1 MG/ML
0.2 INJECTION INTRAVENOUS
Status: DISCONTINUED | OUTPATIENT
Start: 2023-02-24 | End: 2023-02-24 | Stop reason: HOSPADM

## 2023-02-24 RX ORDER — SODIUM CHLORIDE 9 MG/ML
50 INJECTION, SOLUTION INTRAVENOUS CONTINUOUS
Status: DISCONTINUED | OUTPATIENT
Start: 2023-02-24 | End: 2023-02-24 | Stop reason: HOSPADM

## 2023-02-24 RX ORDER — SODIUM CHLORIDE 0.9 % (FLUSH) 0.9 %
5-40 SYRINGE (ML) INJECTION EVERY 8 HOURS
Status: DISCONTINUED | OUTPATIENT
Start: 2023-02-24 | End: 2023-02-24 | Stop reason: HOSPADM

## 2023-02-24 RX ORDER — ATROPINE SULFATE 0.1 MG/ML
0.5 INJECTION INTRAVENOUS
Status: DISCONTINUED | OUTPATIENT
Start: 2023-02-24 | End: 2023-02-24 | Stop reason: HOSPADM

## 2023-02-24 RX ORDER — TRAZODONE HYDROCHLORIDE 50 MG/1
50 TABLET ORAL AS NEEDED
COMMUNITY

## 2023-02-24 RX ORDER — SODIUM CHLORIDE 0.9 % (FLUSH) 0.9 %
5-40 SYRINGE (ML) INJECTION AS NEEDED
Status: DISCONTINUED | OUTPATIENT
Start: 2023-02-24 | End: 2023-02-24 | Stop reason: HOSPADM

## 2023-02-24 RX ADMIN — PROPOFOL 25 MG: 10 INJECTION, EMULSION INTRAVENOUS at 10:17

## 2023-02-24 RX ADMIN — PROPOFOL 25 MG: 10 INJECTION, EMULSION INTRAVENOUS at 10:23

## 2023-02-24 RX ADMIN — SODIUM CHLORIDE: 900 INJECTION, SOLUTION INTRAVENOUS at 10:05

## 2023-02-24 RX ADMIN — PROPOFOL 50 MG: 10 INJECTION, EMULSION INTRAVENOUS at 10:12

## 2023-02-24 RX ADMIN — PROPOFOL 25 MG: 10 INJECTION, EMULSION INTRAVENOUS at 10:21

## 2023-02-24 RX ADMIN — PROPOFOL 25 MG: 10 INJECTION, EMULSION INTRAVENOUS at 10:25

## 2023-02-24 RX ADMIN — PROPOFOL 25 MG: 10 INJECTION, EMULSION INTRAVENOUS at 10:15

## 2023-02-24 RX ADMIN — PROPOFOL 25 MG: 10 INJECTION, EMULSION INTRAVENOUS at 10:14

## 2023-02-24 RX ADMIN — PROPOFOL 25 MG: 10 INJECTION, EMULSION INTRAVENOUS at 10:19

## 2023-02-24 NOTE — OP NOTES
[phone]    Colonoscopy Procedure Note      Indications: Previous adenomatous polyp    : Thais Schmitt MD    Staff: Endoscopy Giuseppe Situ: Edu Khan  Endoscopy RN-1: Zandra Myers RN  Endoscopy RN-2: Lexie Castle RN    Referring Provider: Tina Rivera MD     Anesthesia/Sedation: MAC provided by Anesthesia    Pre-Procedure Exam:  Airway: clear   Heart: normal S1and S2    Lungs: clear bilateral  Abdomen: soft, nontender, bowel sounds present and normal in all quadrants   Mental Status: awake, alert, and oriented to person, place, and time      Procedure in Detail:  Informed consent was obtained for the procedure, including sedation. Risks of perforation, hemorrhage, adverse drug reaction, and aspiration were discussed. The patient was placed in the left lateral decubitus position. Based on the pre-procedure assessment, including review of the patient's medical history, medications, allergies, and review of systems, he had been deemed to be an appropriate candidate for moderate sedation; he was therefore sedated with the medications listed above. The patient was monitored continuously with ECG tracing, pulse oximetry, blood pressure monitoring, and direct observations. A rectal examination was performed. The [id number] was inserted into the rectum and advanced under direct vision to the cecum, which was identified by the ileocecal valve and appendiceal orifice. The quality of the colonic preparation was excellent. A careful inspection was made as the colonoscope was withdrawn, including a retroflexed view of the rectum; findings and interventions are described below. Appropriate photodocumentation was obtained.     Findings:   Rectum:   Normal  Sigmoid:     - normal colorectal anastomosis  Descending Colon:   Normal  Transverse Colon:   Normal  Ascending Colon:   Normal  Cecum:     - Protruding lesions:     -Sessile Polyp(s) size 15 mm removed by Endoscopic mucosal resection. Injected eleview to lift the polyp. Snare cautery polypectomy in a single piece. One resolution clip placed. Specimens:   ID Type Source Tests Collected by Time Destination   1 : Cecal Polyp Preservative Cecum  Linda Burrell MD 2/24/2023 1024 Pathology        EBL: None    Complications: None; patient tolerated the procedure well. Attending Attestation: I performed the procedure. Recommendations:    - Repeat colonoscopy in 1 year    Discharge Disposition: Home in the company of a  when able to ambulate.     Handy Ventura MD  2/24/2023 10:30 AM

## 2023-02-24 NOTE — H&P
Colon and Rectal Surgery History and Physical    Subjective:       Yessenia Prajapati is a 78 y.o. 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     Past Medical History:   Diagnosis Date    Arrhythmia 09/2015    atrial fibrillation 2015    Arthritis     Ill-defined condition 5/20/2016    COLON-VAGINAL FISTULA PER PATIENT    Migraines     PUD (peptic ulcer disease)     in the 30's    Sick sinus syndrome [facility])       Past Surgical History:   Procedure Laterality Date    COLONOSCOPY N/A 07/25/2016    COLONOSCOPY performed by Bryce Hays MD at [facility] ENDOSCOPY    COLONOSCOPY N/A 09/24/2019    COLONOSCOPY performed by Effie Wheat MD at [facility] ENDOSCOPY    COLONOSCOPY N/A 10/13/2020    COLONOSCOPY performed by Effie Wheat MD at [facility] ENDOSCOPY    COLONOSCOPY N/A 02/08/2022    COLONOSCOPY performed by Effie Wheat MD at [facility] ENDOSCOPY    HX BREAST BIOPSY Right     (Neg) right breast stereotactic biopsy; years ago    HX CHOLECYSTECTOMY      HX GI  08/08/2016    colon surgery, sigmoid colectomy with vaginal fistula    HX GYN      partial hysterectomy    HX KNEE ARTHROSCOPY      RIGHT KNEE    HX ORTHOPAEDIC      BILATERAL GREAT TOE FUSIONS    HX ORTHOPAEDIC Bilateral 2009, 2010    carpal tunnel     HX PARTIAL COLECTOMY Left 08/08/2016    Sigmoidectomy with vaginal fistula    HX TONSILLECTOMY      ND UNLISTED PROCEDURE CARDIAC SURGERY  09/25/2015    CARDIOVERSION    ND UNLISTED PROCEDURE CARDIAC SURGERY  01/2019    heart ablation by dr. Reynaldo Forte History     Tobacco Use    Smoking status: Never    Smokeless tobacco: Never   Substance Use Topics    Alcohol use: No      Family History   Problem Relation Age of Onset    Diabetes Mother     Heart Failure Father     Tuberculosis Father     Thyroid Disease Brother     High Cholesterol Sister     Asthma Sister     Anesth Problems Neg Hx Cancer Neg Hx       Prior to Admission medications    Medication Sig Start Date End Date Taking? Authorizing Provider   traZODone (DESYREL) 50 mg tablet Take 50 mg by mouth as needed for Sleep. Yes Provider, Historical   bumetanide (BUMEX) 1 mg tablet Take 1 mg by mouth daily. Yes Provider, Historical   cholecalciferol (VITAMIN D3) (1000 Units /25 mcg) tablet Take 2,000 Units by mouth daily. Yes Provider, Historical   rivaroxaban (XARELTO) 20 mg tab tablet Take 20 mg by mouth daily. Yes Provider, Historical   zinc 50 mg tab tablet Take 50 mg by mouth daily. Provider, Historical   cyanocobalamin 1,000 mcg tablet Take 1,000 mcg by mouth daily. Patient not taking: Reported on 2/24/2023    Provider, Historical   metoprolol succinate (TOPROL-XL) 50 mg XL tablet Take 50 mg by mouth nightly. Provider, Historical   digoxin (LANOXIN) 0.125 mg tablet Take 0.125 mg by mouth nightly. Provider, Historical   folic acid/multivit-min/lutein (CENTRUM SILVER PO) Take 1 Tab by mouth daily. Provider, Historical   loratadine (CLARITIN) 10 mg tablet Take 10 mg by mouth nightly. Other, MD Gary     Allergies   Allergen Reactions    Pcn [Penicillins] Rash    Sulfa (Sulfonamide Antibiotics) Rash        Review of Systems:    A comprehensive review of systems was negative except for that written in the History of Present Illness. Objective:     Visit Vitals  BP (!) 145/87   Pulse (!) 109   Temp 97.7 °F (36.5 °C)   Resp 16   Ht 5' 2\" (1.575 m)   Wt 119 kg (262 lb 4.8 oz)   SpO2 96%   Breastfeeding No   BMI 47.98 kg/m²        Physical Exam:   General:  Alert, cooperative, no distress, appears stated age. Head:  Normocephalic, without obvious abnormality, atraumatic. Eyes:  Conjunctivae/corneas clear. PERRL, EOMs intact. Ears:  Normal external ear canals both ears. Nose: Nares normal. Septum midline. No drainage.    Throat: Lips, mucosa, and tongue normal. Teeth and gums normal.   Neck: Supple, symmetrical, trachea midline, no adenopathy   Back:   Symmetric, no curvature. ROM normal.   Lungs:   Clear   Chest wall:  No tenderness or deformity. Heart:  Regular rate and rhythm       Abdomen:   Soft, non-tender. Bowel sounds normal. No masses,  No organomegaly. Genitalia:  deferred       Extremities: Extremities normal, atraumatic, no cyanosis or edema. Skin: Skin color, texture, turgor normal. No rashes or lesions. Neurologic: CNII-XII intact. Normal strength, sensation and reflexes throughout. Imaging:  images and reports reviewed    Lab Review:  No results found for this or any previous visit (from the past 24 hour(s)). Labs and radiology: images and reports reviewed      Assessment:   Hx louise    Plan:     1. I recommend proceeding with colonoscopy. Treatment alternatives were discussed. 2. Discussed aspects of surgical intervention, methods, risks (including by not limited to infection, bleeding, hematoma, and perforation of the intestines or solid organs), and the risks of general anesthetic. The patient understands the risks; any and all questions were answered to the patient's satisfaction.     Signed By: Trena Wagoner MD     February 24, 2023

## 2023-02-24 NOTE — ANESTHESIA PREPROCEDURE EVALUATION
Anesthetic History   No history of anesthetic complications            Review of Systems / Medical History  Patient summary reviewed, nursing notes reviewed and pertinent labs reviewed    Pulmonary  Within defined limits                 Neuro/Psych         Headaches (migraines)     Cardiovascular            Dysrhythmias (ablation and cardioversion) : atrial fibrillation and atrial flutter      Exercise tolerance: <4 METS  Comments: SSS listed in chart, pt reports she does NOT have SSS. She says prior to her cardioversion she was in Atrial Flutter and was being evaluated for possible SSS. Her cardiologist told her she did not have SSS and pacemaker not needed.     2018 YUNI: 60% EF, mod MR, mild pulm HTN, no intracardiac thrombus     GI/Hepatic/Renal           PUD    Comments: H/O Colon Polyps    H/O colon-vaginal fistula Endo/Other        Morbid obesity and arthritis     Other Findings              Physical Exam    Airway  Mallampati: III  TM Distance: > 6 cm  Neck ROM: normal range of motion   Mouth opening: Normal     Cardiovascular  Regular rate and rhythm,  S1 and S2 normal,  no murmur, click, rub, or gallop             Dental  No notable dental hx       Pulmonary  Breath sounds clear to auscultation               Abdominal  GI exam deferred       Other Findings            Anesthetic Plan    ASA: 3  Anesthesia type: MAC          Induction: Intravenous  Anesthetic plan and risks discussed with: Patient

## 2023-02-24 NOTE — PERIOP NOTES
Initial RN admission and assessment performed and documented in Endoscopy navigator. Patient evaluated by anesthesia in pre-procedure holding. All procedural vital signs, airway assessment, and level of consciousness information monitored and recorded by anesthesia staff on the anesthesia record. Specimen labeled and reviewed with physician. Report received from CRNA post procedure. Patient transported to recovery area by RN. Endoscope was pre-cleaned at bedside immediately following procedure by Melvin Flores.
Patent

## 2023-02-24 NOTE — DISCHARGE INSTRUCTIONS
Marjan Musa  [id number]  1945    COLON DISCHARGE INSTRUCTIONS  Discomfort:  Redness at IV site- apply warm compress to area; if redness or soreness persist- contact your physician  There may be a slight amount of blood passed from the rectum  Gaseous discomfort- walking, belching will help relieve any discomfort  You may not operate a vehicle for 12 hours  You may not engage in an occupation involving machinery or appliances for rest of today  You may not drink alcoholic beverages for at least 12 hours  Avoid making any critical decisions for at least 24 hour  DIET:   High fiber diet. - however -  remember your colon is empty and a heavy meal will produce gas. Avoid these foods:  vegetables, fried / greasy foods, carbonated drinks for today    MEDICATIONS:  Avoid blood thinners for two days       ACTIVITY:  You may resume your normal daily activities it is recommended that you spend the remainder of the day resting -  avoid any strenuous activity. CALL M.D. ANY SIGN OF:   Increasing pain, nausea, vomiting  Abdominal distension (swelling)  New increased bleeding (oral or rectal)  Fever (chills)  Pain in chest area  Bloody discharge from nose or mouth  Shortness of breath     Follow-up Instructions:   Call Herb Rojas MD if any questions or problems. Telephone # [phone]  Biopsy results will be available in  7 to10 days  Should have a repeat colonoscopy in 1 year. COLONOSCOPY FINDINGS:  Your colonoscopy showed: cecal polyp.

## 2023-02-24 NOTE — ANESTHESIA POSTPROCEDURE EVALUATION
Procedure(s):  COLONOSCOPY  INJECTION  ENDOSCOPIC POLYPECTOMY. MAC    Anesthesia Post Evaluation        Patient participation: complete - patient participated  Level of consciousness: awake  Pain management: adequate  Airway patency: patent  Anesthetic complications: no  Cardiovascular status: hemodynamically stable  Respiratory status: acceptable  Hydration status: acceptable  Comments: The patient is ready for PACU discharge.   Poly Madden DO                   Post anesthesia nausea and vomiting:  controlled      INITIAL Post-op Vital signs:   Vitals Value Taken Time   /86 02/24/23 1041   Temp 36.1 °C (97 °F) 02/24/23 1032   Pulse 84 02/24/23 1046   Resp 20 02/24/23 1046   SpO2 95 % 02/24/23 1046

## 2023-09-15 ENCOUNTER — TRANSCRIBE ORDERS (OUTPATIENT)
Facility: HOSPITAL | Age: 78
End: 2023-09-15

## 2023-09-15 DIAGNOSIS — Z12.31 SCREENING MAMMOGRAM FOR HIGH-RISK PATIENT: Primary | ICD-10-CM

## 2023-10-19 ENCOUNTER — HOSPITAL ENCOUNTER (OUTPATIENT)
Facility: HOSPITAL | Age: 78
Discharge: HOME | End: 2023-10-19
Attending: OBSTETRICS & GYNECOLOGY
Payer: MEDICARE

## 2023-10-19 VITALS — WEIGHT: 262.35 LBS | BODY MASS INDEX: 46.48 KG/M2 | HEIGHT: 63 IN

## 2023-10-19 DIAGNOSIS — Z12.31 SCREENING MAMMOGRAM FOR HIGH-RISK PATIENT: ICD-10-CM

## 2023-10-19 PROCEDURE — 77063 BREAST TOMOSYNTHESIS BI: CPT

## 2024-01-18 LAB — HBA1C MFR BLD HPLC: 5.8 %

## 2024-04-15 RX ORDER — ESCITALOPRAM OXALATE 20 MG/1
20 TABLET ORAL DAILY
COMMUNITY

## 2024-04-15 RX ORDER — PROPRANOLOL HYDROCHLORIDE 40 MG/1
40 TABLET ORAL 2 TIMES DAILY
COMMUNITY

## 2024-04-15 RX ORDER — BUPROPION HYDROCHLORIDE 150 MG/1
150 TABLET ORAL 2 TIMES DAILY
COMMUNITY

## 2024-04-15 RX ORDER — TIRZEPATIDE 10 MG/.5ML
10 INJECTION, SOLUTION SUBCUTANEOUS WEEKLY
COMMUNITY

## 2024-04-15 RX ORDER — ASCORBIC ACID 500 MG
500 TABLET ORAL DAILY
COMMUNITY

## 2024-04-15 RX ORDER — HYDROCHLOROTHIAZIDE 25 MG/1
25 TABLET ORAL DAILY
COMMUNITY

## 2024-04-16 ENCOUNTER — ANESTHESIA (OUTPATIENT)
Facility: HOSPITAL | Age: 79
End: 2024-04-16
Payer: MEDICARE

## 2024-04-16 ENCOUNTER — HOSPITAL ENCOUNTER (OUTPATIENT)
Facility: HOSPITAL | Age: 79
Setting detail: OUTPATIENT SURGERY
Discharge: HOME | End: 2024-04-16
Attending: COLON & RECTAL SURGERY | Admitting: COLON & RECTAL SURGERY
Payer: MEDICARE

## 2024-04-16 ENCOUNTER — ANESTHESIA EVENT (OUTPATIENT)
Facility: HOSPITAL | Age: 79
End: 2024-04-16
Payer: MEDICARE

## 2024-04-16 VITALS
WEIGHT: 243 LBS | HEART RATE: 69 BPM | TEMPERATURE: 97 F | HEIGHT: 62 IN | DIASTOLIC BLOOD PRESSURE: 59 MMHG | RESPIRATION RATE: 17 BRPM | SYSTOLIC BLOOD PRESSURE: 112 MMHG | BODY MASS INDEX: 44.72 KG/M2 | OXYGEN SATURATION: 99 %

## 2024-04-16 PROCEDURE — 3600007512: Performed by: COLON & RECTAL SURGERY

## 2024-04-16 PROCEDURE — 2580000003 HC RX 258: Performed by: COLON & RECTAL SURGERY

## 2024-04-16 PROCEDURE — 3700000000 HC ANESTHESIA ATTENDED CARE: Performed by: COLON & RECTAL SURGERY

## 2024-04-16 PROCEDURE — 88305 TISSUE EXAM BY PATHOLOGIST: CPT

## 2024-04-16 PROCEDURE — 2709999900 HC NON-CHARGEABLE SUPPLY: Performed by: COLON & RECTAL SURGERY

## 2024-04-16 PROCEDURE — 6360000002 HC RX W HCPCS: Performed by: ANESTHESIOLOGY

## 2024-04-16 PROCEDURE — 7100000010 HC PHASE II RECOVERY - FIRST 15 MIN: Performed by: COLON & RECTAL SURGERY

## 2024-04-16 PROCEDURE — 3700000001 HC ADD 15 MINUTES (ANESTHESIA): Performed by: COLON & RECTAL SURGERY

## 2024-04-16 PROCEDURE — 3600007502: Performed by: COLON & RECTAL SURGERY

## 2024-04-16 PROCEDURE — 2500000003 HC RX 250 WO HCPCS: Performed by: ANESTHESIOLOGY

## 2024-04-16 RX ORDER — SODIUM CHLORIDE 9 MG/ML
25 INJECTION, SOLUTION INTRAVENOUS PRN
Status: DISCONTINUED | OUTPATIENT
Start: 2024-04-16 | End: 2024-04-16 | Stop reason: HOSPADM

## 2024-04-16 RX ORDER — LIDOCAINE HYDROCHLORIDE 20 MG/ML
INJECTION, SOLUTION EPIDURAL; INFILTRATION; INTRACAUDAL; PERINEURAL PRN
Status: DISCONTINUED | OUTPATIENT
Start: 2024-04-16 | End: 2024-04-16 | Stop reason: SDUPTHER

## 2024-04-16 RX ORDER — SODIUM CHLORIDE 0.9 % (FLUSH) 0.9 %
5-40 SYRINGE (ML) INJECTION EVERY 12 HOURS SCHEDULED
Status: DISCONTINUED | OUTPATIENT
Start: 2024-04-16 | End: 2024-04-16 | Stop reason: HOSPADM

## 2024-04-16 RX ORDER — SODIUM CHLORIDE 0.9 % (FLUSH) 0.9 %
5-40 SYRINGE (ML) INJECTION PRN
Status: DISCONTINUED | OUTPATIENT
Start: 2024-04-16 | End: 2024-04-16 | Stop reason: HOSPADM

## 2024-04-16 RX ADMIN — SODIUM CHLORIDE 25 ML: 9 INJECTION, SOLUTION INTRAVENOUS at 10:15

## 2024-04-16 RX ADMIN — PROPOFOL 180 MG: 10 INJECTION, EMULSION INTRAVENOUS at 10:44

## 2024-04-16 RX ADMIN — LIDOCAINE HYDROCHLORIDE 40 MG: 20 INJECTION, SOLUTION EPIDURAL; INFILTRATION; INTRACAUDAL; PERINEURAL at 10:22

## 2024-04-16 ASSESSMENT — PAIN - FUNCTIONAL ASSESSMENT: PAIN_FUNCTIONAL_ASSESSMENT: NONE - DENIES PAIN

## 2024-04-16 NOTE — DISCHARGE INSTRUCTIONS
Mindi Veliz  [id number]  1945    COLON DISCHARGE INSTRUCTIONS  Discomfort:  Redness at IV site- apply warm compress to area; if redness or soreness persist- contact your physician  There may be a slight amount of blood passed from the rectum  Gaseous discomfort- walking, belching will help relieve any discomfort  You may not operate a vehicle for 12 hours  You may not engage in an occupation involving machinery or appliances for rest of today  You may not drink alcoholic beverages for at least 12 hours  Avoid making any critical decisions for at least 24 hour  DIET:   High fiber diet.   - however -  remember your colon is empty and a heavy meal will produce gas.   Avoid these foods:  vegetables, fried / greasy foods, carbonated drinks for today    MEDICATIONS:  [unfilled]  Hold xarelto for 3 more days     ACTIVITY:  You may resume your normal daily activities it is recommended that you spend the remainder of the day resting -  avoid any strenuous activity.    CALL M.D.  ANY SIGN OF:   Increasing pain, nausea, vomiting  Abdominal distension (swelling)  New increased bleeding (oral or rectal)  Fever (chills)  Pain in chest area  Bloody discharge from nose or mouth  Shortness of breath     Follow-up Instructions:   Call Aleksandr Mahmood MD if any questions or problems.   Telephone # [phone]  Biopsy results will be available in  7 to10 days  Should have a repeat colonoscopy in 1 year.    COLONOSCOPY FINDINGS:  Your colonoscopy showed: 5 small polyps. Sigmoid diverticulosis.    Patient Education on Sedation / Analgesia Administered for Procedure      For 24 hours after general anesthesia or intravenous analgesia / sedation:  Have someone responsible help you with your care  Limit your activities  Do not drive and operate hazardous machinery  Do not make important personal, legal or business decisions  Do not drink alcoholic beverages  If you have not urinated within 8 hours after discharge, please

## 2024-04-16 NOTE — PROGRESS NOTES
Endoscope was pre-cleaned at bedside immediately following procedure by Yaya Delgado    Glasses returned to patient.

## 2024-04-16 NOTE — ANESTHESIA POSTPROCEDURE EVALUATION
Department of Anesthesiology  Postprocedure Note    Patient: Mindi Veliz  MRN: [id number]  YOB: 1945  Date of evaluation: 4/16/2024    Procedure Summary       Date: 04/16/24 Room / Location: [facility] ENDO 03 / [facility] ENDOSCOPY    Anesthesia Start: 1019 Anesthesia Stop: 1044    Procedure: COLONOSCOPY Diagnosis:       Hx of colonic polyps      (Hx of colonic polyps [Z86.010])    Surgeons: Aleksandr Mahmood MD Responsible Provider: Nancy Whitt DO    Anesthesia Type: TIVA ASA Status: 3            Anesthesia Type: TIVA    Sun Phase I: Sun Score: 10    Sun Phase II: Sun Score: 10    Anesthesia Post Evaluation    Patient location during evaluation: PACU  Patient participation: complete - patient participated  Level of consciousness: awake and alert  Airway patency: patent  Nausea & Vomiting: no nausea  Cardiovascular status: hemodynamically stable  Respiratory status: acceptable  Hydration status: euvolemic    No notable events documented.

## 2024-04-16 NOTE — H&P
SpO2 96%   BMI 44.45 kg/m²      Physical Exam:   General:  Alert, cooperative, no distress, appears stated age.   Head:  Normocephalic, without obvious abnormality, atraumatic.   Eyes:  Conjunctivae/corneas clear. PERRL, EOMs intact.    Ears:  Normal external ear canals both ears.   Nose: Nares normal. Septum midline.No drainage.   Throat: Lips, mucosa, and tongue normal. Teeth and gums normal.   Neck: Supple, symmetrical, trachea midline, no adenopathy   Back:   Symmetric, no curvature. ROM normal.   Lungs:   Clear   Chest wall:  No tenderness or deformity.   Heart:  Regular rate and rhythm       Abdomen:   Soft, non-tender. Bowel sounds normal. No masses,  No organomegaly.   Genitalia:  deferred       Extremities: Extremities normal, atraumatic, no cyanosis or edema.       Skin: Skin color, texture, turgor normal. No rashes or lesions.       Neurologic: CNII-XII intact. Normal strength, sensation and reflexes throughout.         Imaging:  images and reports reviewed    Lab Review:  No results found for this or any previous visit (from the past 24 hour(s)).    Labs and radiology: images and reports reviewed      Assessment:     Hx polyps    Plan:     1. I recommend proceeding with colonoscopy. Treatment alternatives were discussed.  2. Discussed aspects of surgical intervention, methods, risks (including by not limited to infection, bleeding, hematoma, and perforation of the intestines or solid organs), and the risks of general anesthetic. The patient understands the risks; any and all questions were answered to the patient's satisfaction.    Signed By: Aleksandr Mahmood MD     April 16, 2024

## 2024-04-16 NOTE — ANESTHESIA PRE PROCEDURE
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    S/P ablation of atrial fibrillation Z98.890, Z86.79       Past Medical History:        Diagnosis Date    Anxiety and depression     Arrhythmia 09/2015    atrial fibrillation 2015    Arthritis     Diabetes mellitus (HCC)     Ill-defined condition 5/20/2016    COLON-VAGINAL FISTULA PER PATIENT    Migraines     PUD (peptic ulcer disease)     in the 30's    Sick sinus syndrome (HCC)        Past Surgical History:        Procedure Laterality Date    BREAST BIOPSY Right     (Neg) right breast stereotactic biopsy; years ago    CHOLECYSTECTOMY      COLONOSCOPY N/A 02/08/2022    COLONOSCOPY performed by Aleksandr Mahmood MD at [facility] ENDOSCOPY    COLONOSCOPY N/A 2/24/2023    COLONOSCOPY performed by Aleksandr Mahmood MD at [facility] ENDOSCOPY    COLONOSCOPY N/A 07/25/2016    COLONOSCOPY performed by Aleksandr Mahmood MD at [facility] ENDOSCOPY    COLONOSCOPY N/A 09/24/2019    COLONOSCOPY performed by Aleksandr Mahmood MD at [facility] ENDOSCOPY    COLONOSCOPY N/A 10/13/2020    COLONOSCOPY performed by Aleksandr Mahmood MD at [facility] ENDOSCOPY    GI  08/08/2016    colon surgery, sigmoid colectomy with vaginal fistula    GYN      partial hysterectomy    HX PARTIAL COLECTOMY Left 08/08/2016    Sigmoidectomy with vaginal fistula    KNEE ARTHROSCOPY      RIGHT KNEE    ORTHOPEDIC SURGERY      BILATERAL GREAT TOE FUSIONS    ORTHOPEDIC SURGERY Bilateral 2009, 2010    carpal tunnel     SD UNLISTED PROCEDURE CARDIAC SURGERY  09/25/2015    CARDIOVERSION    SD UNLISTED PROCEDURE CARDIAC SURGERY  01/2019    heart ablation by dr. Vasquez    TONSILLECTOMY         Social History:    Social History     Tobacco Use    Smoking status: Never    Smokeless tobacco: Never   Substance Use Topics    Alcohol use: No                                Counseling given: Not Answered      Vital Signs (Current):   Vitals:    04/16/24 0930   BP: 133/68   Pulse: 88   Resp: 15

## 2024-04-16 NOTE — OP NOTE
[phone]    Colonoscopy Procedure Note      Indications: Previous adenomatous polyp    : Aleksandr Mahmood MD    Staff: Circulator: Kaela Olmedo RN  Surgical Assistant: Yaya Salazar    Referring Provider: Tio Narayan MD     Anesthesia/Sedation: MAC provided by Anesthesia    Pre-Procedure Exam:  Airway: clear   Heart: normal S1and S2    Lungs: clear bilateral  Abdomen: soft, nontender, bowel sounds present and normal in all quadrants   Mental Status: awake, alert, and oriented to person, place, and time      Procedure in Detail:  Informed consent was obtained for the procedure, including sedation.  Risks of perforation, hemorrhage, adverse drug reaction, and aspiration were discussed. The patient was placed in the left lateral decubitus position.  Based on the pre-procedure assessment, including review of the patient's medical history, medications, allergies, and review of systems, he had been deemed to be an appropriate candidate for moderate sedation; he was therefore sedated with the medications listed above.   The patient was monitored continuously with ECG tracing, pulse oximetry, blood pressure monitoring, and direct observations.      A rectal examination was performed. The [id number] was inserted into the rectum and advanced under direct vision to the cecum, which was identified by the ileocecal valve and appendiceal orifice.  The quality of the colonic preparation was excellent.  A careful inspection was made as the colonoscope was withdrawn, including a retroflexed view of the rectum; findings and interventions are described below.  Appropriate photodocumentation was obtained.    Findings:   Anus:  Normal  Rectum:   Normal  Sigmoid:       -Excavated lesions:     -Diverticulosis  Descending Colon:   Normal  Transverse Colon:   Normal  Ascending Colon:       -Protruding lesions:     -Pedunculated Polyp(s) size 2-3 mm removed by polypectomy (hot biopsy) (2 polyps)  Cecum:

## 2024-05-22 LAB — HBA1C MFR BLD HPLC: 5.2 %

## 2024-09-30 ENCOUNTER — TRANSCRIBE ORDERS (OUTPATIENT)
Facility: HOSPITAL | Age: 79
End: 2024-09-30

## 2024-09-30 DIAGNOSIS — Z12.31 VISIT FOR SCREENING MAMMOGRAM: Primary | ICD-10-CM

## 2024-10-23 ENCOUNTER — HOSPITAL ENCOUNTER (OUTPATIENT)
Facility: HOSPITAL | Age: 79
Discharge: HOME | End: 2024-10-26
Payer: MEDICARE

## 2024-10-23 VITALS — WEIGHT: 243 LBS | BODY MASS INDEX: 44.72 KG/M2 | HEIGHT: 62 IN

## 2024-10-23 DIAGNOSIS — Z12.31 VISIT FOR SCREENING MAMMOGRAM: ICD-10-CM

## 2024-10-23 PROCEDURE — 77063 BREAST TOMOSYNTHESIS BI: CPT

## 2025-01-15 LAB — HBA1C MFR BLD HPLC: 5 %

## 2025-02-26 ENCOUNTER — OFFICE VISIT (OUTPATIENT)
Age: 80
End: 2025-02-26
Payer: MEDICARE

## 2025-02-26 VITALS
DIASTOLIC BLOOD PRESSURE: 58 MMHG | TEMPERATURE: 99 F | RESPIRATION RATE: 19 BRPM | BODY MASS INDEX: 40.7 KG/M2 | WEIGHT: 221.2 LBS | OXYGEN SATURATION: 96 % | SYSTOLIC BLOOD PRESSURE: 98 MMHG | HEIGHT: 62 IN | HEART RATE: 67 BPM

## 2025-02-26 DIAGNOSIS — Z76.89 ENCOUNTER TO ESTABLISH CARE: Primary | ICD-10-CM

## 2025-02-26 DIAGNOSIS — E11.9 CONTROLLED TYPE 2 DIABETES MELLITUS WITHOUT COMPLICATION, WITHOUT LONG-TERM CURRENT USE OF INSULIN (HCC): ICD-10-CM

## 2025-02-26 DIAGNOSIS — Z86.79 S/P ABLATION OF ATRIAL FIBRILLATION: ICD-10-CM

## 2025-02-26 DIAGNOSIS — G62.9 NEUROPATHY: ICD-10-CM

## 2025-02-26 DIAGNOSIS — Z98.890 S/P ABLATION OF ATRIAL FIBRILLATION: ICD-10-CM

## 2025-02-26 DIAGNOSIS — R09.89 RUNNY NOSE: ICD-10-CM

## 2025-02-26 DIAGNOSIS — I65.22 CAROTID STENOSIS, LEFT: ICD-10-CM

## 2025-02-26 PROCEDURE — 99204 OFFICE O/P NEW MOD 45 MIN: CPT | Performed by: INTERNAL MEDICINE

## 2025-02-26 PROCEDURE — 1160F RVW MEDS BY RX/DR IN RCRD: CPT | Performed by: INTERNAL MEDICINE

## 2025-02-26 PROCEDURE — 1126F AMNT PAIN NOTED NONE PRSNT: CPT | Performed by: INTERNAL MEDICINE

## 2025-02-26 PROCEDURE — 1123F ACP DISCUSS/DSCN MKR DOCD: CPT | Performed by: INTERNAL MEDICINE

## 2025-02-26 PROCEDURE — 1159F MED LIST DOCD IN RCRD: CPT | Performed by: INTERNAL MEDICINE

## 2025-02-26 RX ORDER — LEVOCETIRIZINE DIHYDROCHLORIDE 5 MG/1
5 TABLET, FILM COATED ORAL NIGHTLY
Qty: 90 TABLET | Refills: 0 | Status: SHIPPED | OUTPATIENT
Start: 2025-02-26

## 2025-02-26 RX ORDER — TIRZEPATIDE 12.5 MG/.5ML
12.5 INJECTION, SOLUTION SUBCUTANEOUS WEEKLY
COMMUNITY

## 2025-02-26 RX ORDER — ATORVASTATIN CALCIUM 10 MG/1
10 TABLET, FILM COATED ORAL DAILY
COMMUNITY

## 2025-02-26 SDOH — ECONOMIC STABILITY: FOOD INSECURITY: WITHIN THE PAST 12 MONTHS, YOU WORRIED THAT YOUR FOOD WOULD RUN OUT BEFORE YOU GOT MONEY TO BUY MORE.: NEVER TRUE

## 2025-02-26 SDOH — ECONOMIC STABILITY: FOOD INSECURITY: WITHIN THE PAST 12 MONTHS, THE FOOD YOU BOUGHT JUST DIDN'T LAST AND YOU DIDN'T HAVE MONEY TO GET MORE.: NEVER TRUE

## 2025-02-26 ASSESSMENT — PATIENT HEALTH QUESTIONNAIRE - PHQ9
SUM OF ALL RESPONSES TO PHQ QUESTIONS 1-9: 4
7. TROUBLE CONCENTRATING ON THINGS, SUCH AS READING THE NEWSPAPER OR WATCHING TELEVISION: NOT AT ALL
SUM OF ALL RESPONSES TO PHQ QUESTIONS 1-9: 4
3. TROUBLE FALLING OR STAYING ASLEEP: SEVERAL DAYS
10. IF YOU CHECKED OFF ANY PROBLEMS, HOW DIFFICULT HAVE THESE PROBLEMS MADE IT FOR YOU TO DO YOUR WORK, TAKE CARE OF THINGS AT HOME, OR GET ALONG WITH OTHER PEOPLE: NOT DIFFICULT AT ALL
2. FEELING DOWN, DEPRESSED OR HOPELESS: MORE THAN HALF THE DAYS
9. THOUGHTS THAT YOU WOULD BE BETTER OFF DEAD, OR OF HURTING YOURSELF: NOT AT ALL
8. MOVING OR SPEAKING SO SLOWLY THAT OTHER PEOPLE COULD HAVE NOTICED. OR THE OPPOSITE, BEING SO FIGETY OR RESTLESS THAT YOU HAVE BEEN MOVING AROUND A LOT MORE THAN USUAL: NOT AT ALL
5. POOR APPETITE OR OVEREATING: NOT AT ALL
SUM OF ALL RESPONSES TO PHQ QUESTIONS 1-9: 4
4. FEELING TIRED OR HAVING LITTLE ENERGY: NOT AT ALL
6. FEELING BAD ABOUT YOURSELF - OR THAT YOU ARE A FAILURE OR HAVE LET YOURSELF OR YOUR FAMILY DOWN: NOT AT ALL
1. LITTLE INTEREST OR PLEASURE IN DOING THINGS: SEVERAL DAYS
SUM OF ALL RESPONSES TO PHQ9 QUESTIONS 1 & 2: 3
SUM OF ALL RESPONSES TO PHQ QUESTIONS 1-9: 4

## 2025-02-26 ASSESSMENT — ENCOUNTER SYMPTOMS
RESPIRATORY NEGATIVE: 1
GASTROINTESTINAL NEGATIVE: 1

## 2025-02-26 NOTE — PROGRESS NOTES
Chief Complaint   Patient presents with    Establish Care     \"Have you been to the ER, urgent care clinic since your last visit?  Hospitalized since your last visit?\"    NO    “Have you seen or consulted any other health care providers outside our system since your last visit?”    New patient

## 2025-02-26 NOTE — PROGRESS NOTES
Subjective    Mindi Veliz is a 79 y.o. female who presents today for the following:  Chief Complaint   Patient presents with    [facility]       History of Present Illness  The patient is a 79-year-old female who presents to [facility].    She has been under the care of her primary care physician for over 40 years, with her last visit on 01/15/2025. She is currently on Mounjaro 12.5 mg, which was recently increased from 10 mg 2 appointments ago. She has type 2 diabetes and her A1c was high when she started Mounjaro. Her last A1c was 5. She has not had a recent DEXA scan. She has been experiencing constipation and takes an over-the-counter stool softener every few days. She has a history of colonic fistula and gallbladder removal. She has been experiencing intermittent dizziness, which led to a carotid artery scan revealing mild thickening. She is followed by cardiology and primary care.    She reports experiencing symptoms similar to neuropathy, including a burning sensation in her toes at night and a feeling akin to stepping on glass on the soles of her feet. These symptoms disrupt her sleep, but she does not experience them during the day. She does not report any numbness. She describes her overall activity level as very low. She has been using lidocaine roll-on for her feet, which provides some relief. She has a pedal exerciser but can only use it in reverse due to knee pain, limiting her to 15 minutes of exercise per day.    She also reports a constant watery nasal drip that occurs whenever she puts anything in her mouth, such as a toothbrush or food. This symptom has been present for the past 2 months and is not alleviated by Claritin.    She has been experiencing anxiety intermittently.    She has a history of atrial fibrillation and underwent an ablation procedure. She continues to experience episodes of atrial fibrillation post-ablation. She is currently on Eliquis 5 mg once daily, having

## 2025-07-29 ENCOUNTER — TRANSCRIBE ORDERS (OUTPATIENT)
Facility: HOSPITAL | Age: 80
End: 2025-07-29

## 2025-07-29 DIAGNOSIS — Z12.31 VISIT FOR SCREENING MAMMOGRAM: Primary | ICD-10-CM

## (undated) DEVICE — CONTAINER SPEC 20 ML LID NEUT BUFF FORMALIN 10 % POLYPR STS

## (undated) DEVICE — Device

## (undated) DEVICE — TRAP ENDOSCP POLYP 2 CHMBR DRAWER TYP

## (undated) DEVICE — NON-REM POLYHESIVE PATIENT RETURN ELECTRODE: Brand: VALLEYLAB

## (undated) DEVICE — SET ADMIN 16ML TBNG L100IN 2 Y INJ SITE IV PIGGY BK DISP

## (undated) DEVICE — BASIN EMSIS 16OZ GRAPHITE PLAS KID SHP MOLD GRAD FOR ORAL

## (undated) DEVICE — FCPS BX HOT LG 2.2MMX240CM

## (undated) DEVICE — NEONATAL-ADULT SPO2 SENSOR: Brand: NELLCOR

## (undated) DEVICE — 1200 GUARD II KIT W/5MM TUBE W/O VAC TUBE: Brand: GUARDIAN

## (undated) DEVICE — SOLIDIFIER FLD 2OZ 1500CC N DISINF IN BTL DISP SAFESORB

## (undated) DEVICE — SYR 3ML LL TIP 1/10ML GRAD --

## (undated) DEVICE — TOWEL 4 PLY TISS 19X30 SUE WHT

## (undated) DEVICE — CATH IV AUTOGRD BC PNK 20GA 25 -- INSYTE

## (undated) DEVICE — TUBING HYDR IRR --

## (undated) DEVICE — STRAINER URIN CALC RNL MSH -- CONVERT TO ITEM 357634

## (undated) DEVICE — SNARE ENDOSCP M L240CM W27MM SHTH DIA2.4MM CHN 2.8MM OVL

## (undated) DEVICE — Z DISCONTINUED PER MEDLINE LINE GAS SAMPLING O2/CO2 LNG AD 13 FT NSL W/ TBNG FILTERLINE

## (undated) DEVICE — FORCEPS BX L240CM JAW DIA2.4MM ORNG L CAP W/ NDL DISP RAD

## (undated) DEVICE — FORCEPS BX L L240CM DIA2.4MM RAD JAW 4 HOT FOR POLYP DISP

## (undated) DEVICE — BAG SPEC BIOHZRD 10 X 10 IN --

## (undated) DEVICE — Device: Brand: SINGLE USE INJECTOR NM600/610

## (undated) DEVICE — ELECTRODE,RADIOTRANSLUCENT,FOAM,5PK: Brand: MEDLINE

## (undated) DEVICE — SYR 10ML LUER LOK 1/5ML GRAD --

## (undated) DEVICE — IV START KIT: Brand: MEDLINE

## (undated) DEVICE — CUFF BLD PRSS AD CLTH SGL TB W/ BAYNT CONN ROUNDED CORNER

## (undated) DEVICE — SOLIDIFIER MEDC 1200ML -- CONVERT TO 356117

## (undated) DEVICE — NEEDLE HYPO 18GA L1.5IN PNK S STL HUB POLYPR SHLD REG BVL

## (undated) DEVICE — ENDOSCOPIC KIT COMPLIANCE ENDOKIT

## (undated) DEVICE — (D)AGENT INJECTN ELEVIEW 10ML -- DISC BY MFG

## (undated) DEVICE — TRAP,MUCUS SPECIMEN, 80CC: Brand: MEDLINE

## (undated) DEVICE — FORCEPS BX L240CM JAW DIA2.8MM L CAP W/ NDL MIC MESH TOOTH

## (undated) DEVICE — KENDALL RADIOLUCENT FOAM MONITORING ELECTRODE RECTANGULAR SHAPE: Brand: KENDALL

## (undated) DEVICE — HYPODERMIC SAFETY NEEDLE: Brand: MAGELLAN

## (undated) DEVICE — CLIP INT L235CM WRK CHAN DIA2.8MM OPN 11MM LCK MECHANISM MR

## (undated) DEVICE — SNARE ENDOSCP POLYP MED STD AD 2.4X27X240 CM 2.8 MM OVL SENS

## (undated) DEVICE — ELECTRODE PT RET AD L9FT HI MOIST COND ADH HYDRGEL CORDED

## (undated) DEVICE — SET GRAV CK VLV NEEDLESS ST 3 GANGED 4WAY STPCOCK HI FLO 10

## (undated) DEVICE — REM POLYHESIVE ADULT PATIENT RETURN ELECTRODE: Brand: VALLEYLAB